# Patient Record
Sex: MALE | Race: WHITE | Employment: OTHER | URBAN - METROPOLITAN AREA
[De-identification: names, ages, dates, MRNs, and addresses within clinical notes are randomized per-mention and may not be internally consistent; named-entity substitution may affect disease eponyms.]

---

## 2017-02-27 DIAGNOSIS — E78.9 LIPID DISORDER: ICD-10-CM

## 2017-02-27 RX ORDER — SIMVASTATIN 20 MG
TABLET ORAL
Qty: 90 TABLET | Refills: 0 | Status: SHIPPED | OUTPATIENT
Start: 2017-02-27 | End: 2017-05-04 | Stop reason: SDUPTHER

## 2017-05-04 ENCOUNTER — OFFICE VISIT (OUTPATIENT)
Dept: PRIMARY CARE CLINIC | Age: 62
End: 2017-05-04
Payer: COMMERCIAL

## 2017-05-04 VITALS
BODY MASS INDEX: 26.65 KG/M2 | WEIGHT: 190.4 LBS | DIASTOLIC BLOOD PRESSURE: 84 MMHG | RESPIRATION RATE: 16 BRPM | HEIGHT: 71 IN | HEART RATE: 68 BPM | SYSTOLIC BLOOD PRESSURE: 122 MMHG

## 2017-05-04 DIAGNOSIS — N50.819 TESTICULAR PAIN: ICD-10-CM

## 2017-05-04 DIAGNOSIS — C61 PROSTATE CANCER (HCC): ICD-10-CM

## 2017-05-04 DIAGNOSIS — E78.9 LIPID DISORDER: ICD-10-CM

## 2017-05-04 DIAGNOSIS — I10 ESSENTIAL HYPERTENSION: Primary | ICD-10-CM

## 2017-05-04 DIAGNOSIS — J30.2 SEASONAL ALLERGIC RHINITIS, UNSPECIFIED ALLERGIC RHINITIS TRIGGER: ICD-10-CM

## 2017-05-04 PROCEDURE — 99214 OFFICE O/P EST MOD 30 MIN: CPT | Performed by: INTERNAL MEDICINE

## 2017-05-04 RX ORDER — METOPROLOL SUCCINATE 50 MG/1
50 TABLET, EXTENDED RELEASE ORAL DAILY
Qty: 30 TABLET | Refills: 5 | Status: SHIPPED | OUTPATIENT
Start: 2017-05-04 | End: 2017-09-20 | Stop reason: SDUPTHER

## 2017-05-04 RX ORDER — SIMVASTATIN 20 MG
TABLET ORAL
Qty: 90 TABLET | Refills: 0 | Status: SHIPPED | OUTPATIENT
Start: 2017-05-04 | End: 2017-09-20 | Stop reason: SDUPTHER

## 2017-05-04 RX ORDER — FEXOFENADINE HCL 180 MG/1
180 TABLET ORAL DAILY
Qty: 30 TABLET | Refills: 5 | Status: SHIPPED | OUTPATIENT
Start: 2017-05-04 | End: 2017-09-20 | Stop reason: ALTCHOICE

## 2017-05-04 ASSESSMENT — ENCOUNTER SYMPTOMS
BLURRED VISION: 0
BACK PAIN: 0
SHORTNESS OF BREATH: 0
SINUS PRESSURE: 1
ABDOMINAL PAIN: 0
CONSTIPATION: 0
ORTHOPNEA: 0

## 2017-05-04 ASSESSMENT — PATIENT HEALTH QUESTIONNAIRE - PHQ9
SUM OF ALL RESPONSES TO PHQ QUESTIONS 1-9: 0
1. LITTLE INTEREST OR PLEASURE IN DOING THINGS: 0
2. FEELING DOWN, DEPRESSED OR HOPELESS: 0
SUM OF ALL RESPONSES TO PHQ9 QUESTIONS 1 & 2: 0

## 2017-09-20 ENCOUNTER — OFFICE VISIT (OUTPATIENT)
Dept: FAMILY MEDICINE CLINIC | Age: 62
End: 2017-09-20
Payer: COMMERCIAL

## 2017-09-20 VITALS
DIASTOLIC BLOOD PRESSURE: 80 MMHG | WEIGHT: 188.6 LBS | BODY MASS INDEX: 26.4 KG/M2 | HEIGHT: 71 IN | TEMPERATURE: 97.8 F | SYSTOLIC BLOOD PRESSURE: 118 MMHG | HEART RATE: 64 BPM

## 2017-09-20 DIAGNOSIS — J30.9 CHRONIC ALLERGIC RHINITIS: ICD-10-CM

## 2017-09-20 DIAGNOSIS — J31.0 CHRONIC RHINITIS: ICD-10-CM

## 2017-09-20 DIAGNOSIS — I10 ESSENTIAL HYPERTENSION: ICD-10-CM

## 2017-09-20 DIAGNOSIS — E78.9 LIPID DISORDER: ICD-10-CM

## 2017-09-20 DIAGNOSIS — J06.9 VIRAL URI: Primary | ICD-10-CM

## 2017-09-20 PROCEDURE — 99213 OFFICE O/P EST LOW 20 MIN: CPT | Performed by: FAMILY MEDICINE

## 2017-09-20 RX ORDER — METOPROLOL SUCCINATE 50 MG/1
50 TABLET, EXTENDED RELEASE ORAL DAILY
Qty: 30 TABLET | Refills: 11 | Status: SHIPPED | OUTPATIENT
Start: 2017-09-20

## 2017-09-20 RX ORDER — SIMVASTATIN 20 MG
TABLET ORAL
Qty: 90 TABLET | Refills: 3 | Status: SHIPPED | OUTPATIENT
Start: 2017-09-20

## 2017-09-20 RX ORDER — FLUTICASONE PROPIONATE 50 MCG
1 SPRAY, SUSPENSION (ML) NASAL DAILY
Qty: 1 BOTTLE | Refills: 3 | Status: SHIPPED | OUTPATIENT
Start: 2017-09-20

## 2017-09-20 RX ORDER — INFLUENZA A VIRUS A/SINGAPORE/GP1908/2015 IVR-180 (H1N1) ANTIGEN (MDCK CELL DERIVED, PROPIOLACTONE INACTIVATED), INFLUENZA A VIRUS A/NORTH CAROLINA/04/2016 (H3N2) HEMAGGLUTININ ANTIGEN (MDCK CELL DERIVED, PROPIOLACTONE INACTIVATED), INFLUENZA B VIRUS B/IOWA/06/2017 HEMAGGLUTININ ANTIGEN (MDCK CELL DERIVED, PROPIOLACTONE INACTIVATED), INFLUENZA B VIRUS B/SINGAPORE/INFTT-16-0610/2016 HEMAGGLUTININ ANTIGEN (MDCK CELL DERIVED, PROPIOLACTONE INACTIVATED) 15; 15; 15; 15 UG/.5ML; UG/.5ML; UG/.5ML; UG/.5ML
INJECTION, SUSPENSION INTRAMUSCULAR
Refills: 0 | COMMUNITY
Start: 2017-09-05

## 2017-09-20 RX ORDER — FEXOFENADINE HCL AND PSEUDOEPHEDRINE HCI 180; 240 MG/1; MG/1
1 TABLET, EXTENDED RELEASE ORAL DAILY
Qty: 30 TABLET | Refills: 5 | Status: SHIPPED | OUTPATIENT
Start: 2017-09-20 | End: 2017-12-07 | Stop reason: ALTCHOICE

## 2017-09-20 RX ORDER — AZITHROMYCIN 250 MG/1
TABLET, FILM COATED ORAL
Qty: 1 PACKET | Refills: 0 | Status: SHIPPED | OUTPATIENT
Start: 2017-09-20 | End: 2017-09-30

## 2017-09-20 ASSESSMENT — ENCOUNTER SYMPTOMS
CHEST TIGHTNESS: 0
COUGH: 0
SHORTNESS OF BREATH: 0
SORE THROAT: 0
BLOOD IN STOOL: 0
NAUSEA: 0
VOMITING: 0
ORTHOPNEA: 0
ABDOMINAL PAIN: 0

## 2017-09-25 ENCOUNTER — OFFICE VISIT (OUTPATIENT)
Dept: FAMILY MEDICINE CLINIC | Age: 62
End: 2017-09-25
Payer: COMMERCIAL

## 2017-09-25 VITALS
WEIGHT: 187.6 LBS | HEART RATE: 61 BPM | HEIGHT: 71 IN | SYSTOLIC BLOOD PRESSURE: 141 MMHG | TEMPERATURE: 98.1 F | BODY MASS INDEX: 26.26 KG/M2 | DIASTOLIC BLOOD PRESSURE: 85 MMHG

## 2017-09-25 DIAGNOSIS — G25.2 TREMOR, COARSE: Primary | ICD-10-CM

## 2017-09-25 DIAGNOSIS — R45.89 ANXIOUS APPEARANCE: ICD-10-CM

## 2017-09-25 PROCEDURE — 99213 OFFICE O/P EST LOW 20 MIN: CPT | Performed by: FAMILY MEDICINE

## 2017-09-25 RX ORDER — LORAZEPAM 0.5 MG/1
0.5 TABLET ORAL EVERY 8 HOURS PRN
Qty: 6 TABLET | Refills: 0 | Status: SHIPPED | OUTPATIENT
Start: 2017-09-25 | End: 2017-11-01 | Stop reason: SDUPTHER

## 2017-09-25 ASSESSMENT — ENCOUNTER SYMPTOMS
SHORTNESS OF BREATH: 0
ABDOMINAL PAIN: 0
CHEST TIGHTNESS: 0

## 2017-10-05 ENCOUNTER — OFFICE VISIT (OUTPATIENT)
Dept: FAMILY MEDICINE CLINIC | Age: 62
End: 2017-10-05
Payer: COMMERCIAL

## 2017-10-05 VITALS
TEMPERATURE: 98.7 F | BODY MASS INDEX: 25.9 KG/M2 | WEIGHT: 185 LBS | SYSTOLIC BLOOD PRESSURE: 123 MMHG | DIASTOLIC BLOOD PRESSURE: 82 MMHG | HEART RATE: 65 BPM | HEIGHT: 71 IN

## 2017-10-05 DIAGNOSIS — K08.89 PAIN OF TOOTH SOCKET: ICD-10-CM

## 2017-10-05 DIAGNOSIS — I10 ESSENTIAL HYPERTENSION: Primary | ICD-10-CM

## 2017-10-05 DIAGNOSIS — F43.22 ADJUSTMENT DISORDER WITH ANXIOUS MOOD: ICD-10-CM

## 2017-10-05 PROCEDURE — 99213 OFFICE O/P EST LOW 20 MIN: CPT | Performed by: FAMILY MEDICINE

## 2017-10-05 ASSESSMENT — ENCOUNTER SYMPTOMS
COUGH: 0
SORE THROAT: 0
NAUSEA: 0
SHORTNESS OF BREATH: 0
ABDOMINAL PAIN: 0
CHEST TIGHTNESS: 0
BLOOD IN STOOL: 0
VOMITING: 0

## 2017-10-05 NOTE — PROGRESS NOTES
Subjective:      Patient ID: Jordan Beaulieu is a 57 y.o. male. HPI Comments: Recent dental procedure - feels flushed, ill, interest in pursuing blood work  Pending referrals to Neurology and ENT (self directed as per his worries). Fatigue   This is a new problem. The current episode started in the past 7 days. The problem occurs intermittently. The problem has been unchanged. Associated symptoms include fatigue. Pertinent negatives include no abdominal pain, arthralgias, chest pain, congestion, coughing, fever, myalgias, nausea, sore throat, vomiting or weakness. Nothing aggravates the symptoms. He has tried nothing for the symptoms. The treatment provided no relief. Review of Systems   Constitutional: Positive for fatigue. Negative for activity change and fever. HENT: Negative for congestion and sore throat. Respiratory: Negative for cough, chest tightness and shortness of breath. Cardiovascular: Negative for chest pain and leg swelling. Gastrointestinal: Negative for abdominal pain, blood in stool, nausea and vomiting. Genitourinary: Negative for dysuria and frequency. Musculoskeletal: Negative for arthralgias and myalgias. Neurological: Negative for dizziness, weakness and light-headedness. Hematological: Negative for adenopathy. Psychiatric/Behavioral: Negative for agitation, behavioral problems, sleep disturbance and suicidal ideas. Anxious / worried - unchanged from last visit several days ago. Objective:   Physical Exam   Constitutional: He appears well-developed and well-nourished. HENT:   Head: Normocephalic and atraumatic. Right Ear: External ear normal.   Left Ear: External ear normal.   Eyes: Conjunctivae are normal.   Neck: Neck supple. Cardiovascular: Normal rate, regular rhythm and normal heart sounds. Pulmonary/Chest: Effort normal and breath sounds normal.   Skin: Skin is warm and dry. No rash noted. No erythema. No pallor.    Psychiatric: He has a normal mood and affect. His behavior is normal. Judgment and thought content normal.       Assessment:      1. Essential hypertension  CBC With Auto Differential    Basic Metabolic Panel    Hepatic Function Panel   2. Pain of tooth socket  CBC With Auto Differential    Basic Metabolic Panel    Hepatic Function Panel   3. Adjustment disorder with anxious mood  CBC With Auto Differential    Basic Metabolic Panel    Hepatic Function Panel           Plan:      Labs - today - call results    Follow up in 3-4 days.

## 2017-10-05 NOTE — PROGRESS NOTES
HYPERTENSION visit     BP Readings from Last 3 Encounters:   10/05/17 123/82   09/25/17 (!) 141/85   09/20/17 118/80       BUN (mg/dL)   Date Value   12/09/2011 22 (H)     Creatinine (mg/dL)   Date Value   12/09/2011 0.87     Glucose (mg/dL)   Date Value   12/09/2011 81              Have you changed or started any medications since your last visit including any over-the-counter medicines, vitamins, or herbal medicines? no   Have you stopped taking any of your medications? Is so, why? -  no  Are you having any side effects from any of your medications? - no    Have you seen any other physician or provider since your last visit? yes - Dentist   Have you had any other diagnostic tests since your last visit?  no   Have you been seen in the emergency room and/or had an admission in a hospital since we last saw you?  no   Have you had your routine dental cleaning in the past 6 months?  no     Do you have an active MyChart account? If no, what is the barrier?   No: Pending    Patient Care Team:  Christelle Hubbard DO as PCP - General (Family Medicine)    Medical History Review  Past Medical, Family, and Social History reviewed and does contribute to the patient presenting condition    Health Maintenance   Topic Date Due    Hepatitis C screen  1955    HIV screen  12/06/1969    Lipid screen  12/06/1994    Diabetes screen  12/06/1994    Zostavax vaccine  12/06/2014    DTaP/Tdap/Td vaccine (1 - Tdap) 11/22/2017 (Originally 12/6/1973)    Flu vaccine (1) 11/26/2017 (Originally 9/1/2017)    Colon cancer screen colonoscopy  01/01/2020

## 2017-10-05 NOTE — MR AVS SNAPSHOT
After Visit Summary             Alicia Kay   10/5/2017 9:30 AM   Office Visit    Description:  Male : 1955   Provider:  Mario Chacon DO   Department:  [facility]              Your Follow-Up and Future Appointments         Below is a list of your follow-up and future appointments. This may not be a complete list as you may have made appointments directly with providers that we are not aware of or your providers may have made some for you. Please call your providers to confirm appointments. It is important to keep your appointments. Please bring your current insurance card, photo ID, co-pay, and all medication bottles to your appointment. If self-pay, payment is expected at the time of service. Your To-Do List     Future Appointments Provider Department Dept Phone    1/3/2018 9:00 AM Cole Chow MD [facility] Neu[facility] Specialist [phone]    Please arrive 15 minutes prior to scheduled appointment time to complete paper work, bring photo ID and insurance cards.     Future Orders Complete By Expires    Basic Metabolic Panel [GPB19 Custom]  10/5/2017 10/5/2018    CBC With Auto Differential [TPV2885 Custom]  10/5/2017 10/5/2018    Hepatic Function Panel [LAB20 Custom]  10/5/2017 10/5/2018         Information from Your Visit        Department     Name Address Phone Fax    [address] [place] [address] [phone]      You Were Seen for:         Comments    Essential hypertension   [433967]         Vital Signs     Blood Pressure Pulse Temperature Height Weight Body Mass Index    123/82 (Site: Left Arm, Position: Sitting, Cuff Size: Large Adult) 65 98.7 °F (37.1 °C) (Temporal) 5' 10.87\" (1.8 m) 185 lb (83.9 kg) 25.9 kg/m2    Smoking Status                   Former Smoker           Additional Information about your Body Mass Index (BMI) Your BMI as listed above is considered overweight (25.0-29.9). BMI is an estimate of body fat, calculated from your height and weight. The higher your BMI, the greater your risk of heart disease, high blood pressure, type 2 diabetes, stroke, gallstones, arthritis, sleep apnea, and certain cancers. BMI is not perfect. It may overestimate body fat in athletes and people who are more muscular. If your body fat is high you can improve your BMI by decreasing your calorie intake and becoming more physically active. Learn more at: [employer]          Instructions    Thank you for letting us take care of you today. We hope all your questions were addressed. If a question was overlooked or something else comes to mind after you return home, please contact a member of your Care Team listed below. Please make sure you have a routine office visit set up to follow-up on [address]. Your Care Team at [place] [address] is Team #2  Radha Velasquez DO (Faculty)  Olman Duvall MD (Resindent)  Reji Tam MD (Resident)  Leatha Campbell MD (Resident)  Keenan Nuno MD (Resident)  Keya Atkinson MD (Resident)  Kednall Longoria MELANIE  Saintclair Blessing., RMA  Omid Elliott, ANTONI Mckinney ([address])  Lawson Dorantes RN, ([address] [facility])  Jesus Cortez, Ph.D., (Behavioral Services)  Milo Mcdermott [facility] (Clinical Pharmacist)     Office phone number: [phone]    If you need to get in right away due to illness, please be advised we have \"Same Day\" appointments available Monday-Friday. Please call us at [phone] option #1 to schedule your \"Same Day\" appointment.                 Medications and Orders      Your Current Medications Are              LORazepam (ATIVAN) 0.5 MG tablet Take 1 tablet by mouth every 8 hours as needed for Anxiety    FLUCELVAX QUADRIVALENT 0.5 ML injection ADM 0.5ML IM UTD

## 2017-10-10 ENCOUNTER — TELEPHONE (OUTPATIENT)
Dept: ADMINISTRATIVE | Age: 62
End: 2017-10-10

## 2017-10-10 NOTE — TELEPHONE ENCOUNTER
Sarah Cristhian call Pathology Labs and inquire if the results of his recent blood tests could be forwarded to his chart for review. (scan / upload to media). Please notify him that we are actively pursuing the results and as soon as I see them I will report the results.     Thank-you,    Electronically signed by Shanita Valle DO on 10/10/2017 at 2:26 PM

## 2017-10-10 NOTE — TELEPHONE ENCOUNTER
Looking into Pt's chart if he is calling in regards to lab work it is still showing them as being active, please advise

## 2017-10-10 NOTE — TELEPHONE ENCOUNTER
Patient called yesterday requesting test results and has not been contacted yet. Please call the patient today in regards to his results.

## 2017-10-11 ENCOUNTER — TELEPHONE (OUTPATIENT)
Dept: FAMILY MEDICINE CLINIC | Age: 62
End: 2017-10-11

## 2017-10-11 NOTE — TELEPHONE ENCOUNTER
Copy of Pathology Labs mailed to patient. (BMP, LFT and CBC)  Labs reviewed - all within range of normal.  No further action needed.     Electronically signed by Rivera Wright DO on 10/11/2017 at 1:55 PM

## 2017-10-12 ENCOUNTER — TELEPHONE (OUTPATIENT)
Dept: FAMILY MEDICINE CLINIC | Age: 62
End: 2017-10-12

## 2017-10-12 ENCOUNTER — OFFICE VISIT (OUTPATIENT)
Dept: FAMILY MEDICINE CLINIC | Age: 62
End: 2017-10-12
Payer: COMMERCIAL

## 2017-10-12 VITALS
TEMPERATURE: 98.6 F | WEIGHT: 185.2 LBS | DIASTOLIC BLOOD PRESSURE: 87 MMHG | HEIGHT: 71 IN | BODY MASS INDEX: 25.93 KG/M2 | SYSTOLIC BLOOD PRESSURE: 129 MMHG | HEART RATE: 66 BPM

## 2017-10-12 DIAGNOSIS — R06.02 SHORTNESS OF BREATH: ICD-10-CM

## 2017-10-12 DIAGNOSIS — R07.89 SENSATION OF CHEST PRESSURE: ICD-10-CM

## 2017-10-12 DIAGNOSIS — R00.2 HEART PALPITATIONS: Primary | ICD-10-CM

## 2017-10-12 PROCEDURE — 99213 OFFICE O/P EST LOW 20 MIN: CPT | Performed by: RADIOLOGY

## 2017-10-12 ASSESSMENT — ENCOUNTER SYMPTOMS
COUGH: 0
ABDOMINAL PAIN: 0
CONSTIPATION: 0
SHORTNESS OF BREATH: 1
WHEEZING: 0
NAUSEA: 0
VOMITING: 0
DIARRHEA: 0

## 2017-10-12 NOTE — PATIENT INSTRUCTIONS
Thank you for letting us take care of you today. We hope all your questions were addressed. If a question was overlooked or something else comes to mind after you return home, please contact a member of your Care Team listed below. Please make sure you have a routine office visit set up to follow-up on [address]. Your Care Team at [place] [address] is Team #1  Mara Ahumada, MD (Faculty)  Yvonne Rehman MD (Faculty  Chareri Orellana MD (Resident)  Marylin Moore MD (Resident)  Rock Rick MD (Resident)  May Rosas MD (Resident)  Kyle Knight MD (Resident)  Andrea Khanna TAVON Noble TAVON SOLITARIO, [facility], ([address] [facility])  DANISH Weiss, ([address] [facility])  Siddhartha Cervantes, Ph.D., ([facility])  Junior Guerra [facility] (Clinical Pharmacist)     Office phone number: [phone]    If you need to get in right away due to illness, please be advised we have \"Same Day\" appointments available Monday-Friday. Please call us at [phone] option #1 to schedule your \"Same Day\" appointment.

## 2017-10-12 NOTE — PROGRESS NOTES
Visit Information    Have you changed or started any medications since your last visit including any over-the-counter medicines, vitamins, or herbal medicines? no   Have you stopped taking any of your medications? Is so, why? -  no  Are you having any side effects from any of your medications? - no    Have you seen any other physician or provider since your last visit? ENT Dr. Mark Tai  Have you had any other diagnostic tests since your last visit? yes - Endoscope   Have you been seen in the emergency room and/or had an admission in a hospital since we last saw you?  no   Have you had your routine dental cleaning in the past 6 months?  no     Do you have an active MyChart account? If no, what is the barrier?   No: Pending    Patient Care Team:  Jamel Suarez DO as PCP - General (Family Medicine)    Medical History Review  Past Medical, Family, and Social History reviewed and does not contribute to the patient presenting condition    Health Maintenance   Topic Date Due    Hepatitis C screen  1955    HIV screen  12/06/1969    Lipid screen  12/06/1994    Diabetes screen  12/06/1994    Zostavax vaccine  12/06/2014    DTaP/Tdap/Td vaccine (1 - Tdap) 11/22/2017 (Originally 12/6/1973)    Flu vaccine (1) 11/26/2017 (Originally 9/1/2017)    Colon cancer screen colonoscopy  01/01/2020

## 2017-10-12 NOTE — PROGRESS NOTES
Subjective:      Patient ID: Dae Mcclendon is a 57 y.o. male. HPI    Pt is a 56 yo M presenting with c/o heart \"fluttering\" and chest pressure starting overnight ~0100. Pt describes \"heart fluttering\" sensation in center sternum, no radiation, no pain. States severity has improved since last night. Pt reports that he had a tooth extraction at dentist ~3 weeks prior, developed symptoms of lightheadedness, throbbing/pounding HA worse when laying down, dyspnea with exertion and at rest, and subjective fever/chills. Pt reports checking BP at home, elevated multiple times to diastolic 623-150'U. No elevation in HR. Pt states he has had to sleep upright 2/2 throbbing HA and dyspnea. Denies any swelling/edema. Endorses good compliance with medication. Saw ENT yesterday for continued concern over tooth extraction/jaw pain, was given Augmentin 2/2 infection. Denies fever/chills, N/V, abdominal pain, diarrhea, constipation, dysuria    Review of Systems   Constitutional: Positive for chills, fatigue and fever (subjective). Negative for diaphoresis. Respiratory: Positive for shortness of breath. Negative for cough and wheezing. Cardiovascular: Positive for chest pain (pressure) and palpitations. Negative for leg swelling. Gastrointestinal: Negative for abdominal pain, constipation, diarrhea, nausea and vomiting. Genitourinary: Negative for difficulty urinating and dysuria. Objective:   Physical Exam   Constitutional: He appears well-developed and well-nourished. No distress. Neck: No JVD present. Cardiovascular: Normal rate, regular rhythm and intact distal pulses. Exam reveals distant heart sounds. Pulmonary/Chest: Effort normal and breath sounds normal. No respiratory distress. He has no wheezes. He has no rales. Abdominal: Soft. Bowel sounds are normal. He exhibits no distension. There is no tenderness. There is no rebound and no guarding. Musculoskeletal: He exhibits no edema.

## 2017-10-16 ENCOUNTER — TELEPHONE (OUTPATIENT)
Dept: FAMILY MEDICINE CLINIC | Age: 62
End: 2017-10-16

## 2017-10-16 NOTE — TELEPHONE ENCOUNTER
Pt calling states he is not any better having chest pain and palpitations. Was seen here in the office on 10-13-17 as a same day appointment. Pt seen by Dr Hector Presfuad in our office. Given a echo and ekg order to do outpatient. Pt states he went to [facility] had EKG done but the \"did not have time to do echo\". After speaking with this pt advised him to proceed to the ER for evaluation. Pt refused stressed the importance he states\" he cant afford this \". States he has appt with Dr Wenceslao Guadarrama with [facility] this Wednesday but they wont see him till he \"gets the echo done\" Call to Jakob Lopez at [facility] this is not the case. It has to do with with insurance certification. Call to [facility] requested copy of EKG. Call to central scheduling scheduled pt a echocardiogram appointment at the heart and vascular center at [facility] for 1030 am tomorrow. Pt also given directions on the location of the building and to arrive at 10 am. Asking for labs done at path labs results given. EKG received [facility] and will be scanned into pts chart with the labs also. Pt still advised to go to the ER if  symptoms worsen. Pt verbalized understanding of this plan of care

## 2017-10-17 ENCOUNTER — HOSPITAL ENCOUNTER (OUTPATIENT)
Dept: NON INVASIVE DIAGNOSTICS | Age: 62
Discharge: HOME | End: 2017-10-17
Payer: COMMERCIAL

## 2017-10-17 DIAGNOSIS — R06.02 SHORTNESS OF BREATH: ICD-10-CM

## 2017-10-17 DIAGNOSIS — R00.2 HEART PALPITATIONS: ICD-10-CM

## 2017-10-17 LAB
LV EF: 58 %
LVEF MODALITY: NORMAL

## 2017-10-17 PROCEDURE — 93306 TTE W/DOPPLER COMPLETE: CPT

## 2017-10-26 ENCOUNTER — TELEPHONE (OUTPATIENT)
Dept: ADMINISTRATIVE | Age: 62
End: 2017-10-26

## 2017-10-26 NOTE — TELEPHONE ENCOUNTER
Patient is calling because he would like some more of the ativan that was prescribed for him. He has some vestibular testing on mon 10/30/17. This medications helps with his shakiness.   Please address

## 2017-10-30 ENCOUNTER — TELEPHONE (OUTPATIENT)
Dept: FAMILY MEDICINE CLINIC | Age: 62
End: 2017-10-30

## 2017-10-30 NOTE — TELEPHONE ENCOUNTER
PT called again today, explained that notes have been routed onward from med assistant and that notes pends.     Pt can be reached at [phone]

## 2017-10-30 NOTE — TELEPHONE ENCOUNTER
Pt is calling again for ativan , he was prescribed this before. Pt has some testing coming up and the medication helps with his shakiness. Please address , thank you.

## 2017-10-31 ENCOUNTER — TELEPHONE (OUTPATIENT)
Dept: FAMILY MEDICINE CLINIC | Age: 62
End: 2017-10-31

## 2017-10-31 NOTE — TELEPHONE ENCOUNTER
Good morning patient is calling again about his script for Ativan, need for the Shakes, please thanks writer.

## 2017-10-31 NOTE — TELEPHONE ENCOUNTER
Appointment offered to patient for tomorrow morning at 8:00 or 8:30 AM    No phone refills. Patient accepted 8:30.

## 2017-11-01 ENCOUNTER — OFFICE VISIT (OUTPATIENT)
Dept: FAMILY MEDICINE CLINIC | Age: 62
End: 2017-11-01
Payer: COMMERCIAL

## 2017-11-01 VITALS
WEIGHT: 186.2 LBS | TEMPERATURE: 97.7 F | SYSTOLIC BLOOD PRESSURE: 120 MMHG | HEART RATE: 74 BPM | HEIGHT: 71 IN | DIASTOLIC BLOOD PRESSURE: 72 MMHG | BODY MASS INDEX: 26.07 KG/M2

## 2017-11-01 DIAGNOSIS — F41.8 SITUATIONAL ANXIETY: ICD-10-CM

## 2017-11-01 DIAGNOSIS — G25.2 TREMOR, COARSE: Primary | ICD-10-CM

## 2017-11-01 PROCEDURE — 1036F TOBACCO NON-USER: CPT | Performed by: FAMILY MEDICINE

## 2017-11-01 PROCEDURE — 99213 OFFICE O/P EST LOW 20 MIN: CPT | Performed by: FAMILY MEDICINE

## 2017-11-01 PROCEDURE — G8484 FLU IMMUNIZE NO ADMIN: HCPCS | Performed by: FAMILY MEDICINE

## 2017-11-01 PROCEDURE — 3017F COLORECTAL CA SCREEN DOC REV: CPT | Performed by: FAMILY MEDICINE

## 2017-11-01 PROCEDURE — G8417 CALC BMI ABV UP PARAM F/U: HCPCS | Performed by: FAMILY MEDICINE

## 2017-11-01 PROCEDURE — G8427 DOCREV CUR MEDS BY ELIG CLIN: HCPCS | Performed by: FAMILY MEDICINE

## 2017-11-01 RX ORDER — LORAZEPAM 0.5 MG/1
0.5 TABLET ORAL EVERY 8 HOURS PRN
Qty: 15 TABLET | Refills: 0 | Status: SHIPPED | OUTPATIENT
Start: 2017-11-01 | End: 2017-11-16

## 2017-11-01 ASSESSMENT — ENCOUNTER SYMPTOMS
SORE THROAT: 0
DIARRHEA: 0
BACK PAIN: 0
DOUBLE VISION: 0
BLOOD IN STOOL: 0
BLURRED VISION: 0
SHORTNESS OF BREATH: 0
EYE PAIN: 0
WHEEZING: 0
ORTHOPNEA: 0
COUGH: 0
NAUSEA: 0
ABDOMINAL PAIN: 0
VOMITING: 0

## 2017-11-01 NOTE — PROGRESS NOTES
Visit Information    Have you changed or started any medications since your last visit including any over-the-counter medicines, vitamins, or herbal medicines? no   Have you stopped taking any of your medications? Is so, why? -  no  Are you having any side effects from any of your medications? - no    Have you seen any other physician or provider since your last visit? yes - Dr. Yuval Hou (Cardiology), and Dr. Gualberto Browning (ENT), ENG at [facility]. Have you had any other diagnostic tests since your last visit? yes -    Have you been seen in the emergency room and/or had an admission in a hospital since we last saw you?  no   Have you had your routine dental cleaning in the past 6 months?  no     Do you have an active MyChart account? If no, what is the barrier?   No: Declined    Patient Care Team:  Esteafny Pendleton DO as PCP - General (Family Medicine)    Medical History Review  Past Medical, Family, and Social History reviewed and does not contribute to the patient presenting condition    Health Maintenance   Topic Date Due    Hepatitis C screen  1955    HIV screen  12/06/1969    Lipid screen  12/06/1994    Diabetes screen  12/06/1994    Zostavax vaccine  12/06/2014    DTaP/Tdap/Td vaccine (1 - Tdap) 11/22/2017 (Originally 12/6/1973)    Flu vaccine (1) 11/26/2017 (Originally 9/1/2017)    Colon cancer screen colonoscopy  01/01/2020
short fill. The patient is asked to return in 1 week.                   Electronically signed by Gemma Martin DO on 11/1/2017 at 9:08 AM

## 2017-11-01 NOTE — PATIENT INSTRUCTIONS
Thank you for letting us take care of you today. We hope all your questions were addressed. If a question was overlooked or something else comes to mind after you return home, please contact a member of your Care Team listed below. Please make sure you have a routine office visit set up to follow-up on [address]. Your Care Team at [place] [address] is Team #2  Tanika Brothers DO (Faculty)  Robert Vidal MD (Resindent)  Todd Payan MD (Resident)  Maria Teresa Jung MD (Resident)  Trisha Ag MD (Resident)  Yessi Pantoja MD (Resident)  Bebeto Delgado, MELANIE  [employer] [facility]., A  Adamaris Dodson, A  Juan J Brewer ([facility])  Kandi Ball RN, ([address] [facility])  Christian Ma, Ph.D., (Behavioral Services)  Cyndie Jefferson, [facility] (Clinical Pharmacist)     Office phone number: [phone]    If you need to get in right away due to illness, please be advised we have \"Same Day\" appointments available Monday-Friday. Please call us at [phone] option #1 to schedule your \"Same Day\" appointment.

## 2017-11-02 ENCOUNTER — OFFICE VISIT (OUTPATIENT)
Dept: NEUROLOGY | Age: 62
End: 2017-11-02
Payer: COMMERCIAL

## 2017-11-02 VITALS
HEART RATE: 69 BPM | DIASTOLIC BLOOD PRESSURE: 84 MMHG | BODY MASS INDEX: 25.68 KG/M2 | WEIGHT: 183.4 LBS | HEIGHT: 71 IN | SYSTOLIC BLOOD PRESSURE: 131 MMHG

## 2017-11-02 DIAGNOSIS — R53.1 LEFT-SIDED WEAKNESS: ICD-10-CM

## 2017-11-02 DIAGNOSIS — R25.1 TREMOR: Primary | ICD-10-CM

## 2017-11-02 PROCEDURE — G8417 CALC BMI ABV UP PARAM F/U: HCPCS | Performed by: NURSE PRACTITIONER

## 2017-11-02 PROCEDURE — 3017F COLORECTAL CA SCREEN DOC REV: CPT | Performed by: NURSE PRACTITIONER

## 2017-11-02 PROCEDURE — G8484 FLU IMMUNIZE NO ADMIN: HCPCS | Performed by: NURSE PRACTITIONER

## 2017-11-02 PROCEDURE — G8427 DOCREV CUR MEDS BY ELIG CLIN: HCPCS | Performed by: NURSE PRACTITIONER

## 2017-11-02 PROCEDURE — 99203 OFFICE O/P NEW LOW 30 MIN: CPT | Performed by: NURSE PRACTITIONER

## 2017-11-02 PROCEDURE — 1036F TOBACCO NON-USER: CPT | Performed by: NURSE PRACTITIONER

## 2017-11-02 NOTE — PROGRESS NOTES
[facility]  [facility], [address] [place] [address], [address] [place] [address]  Dept: [phone]  Dept Fax: [phone]                              MD Carol Nash MD Ahmed B. Alferd Breed, MD Tyrell Peeks, MD Judieth Leigh, MD Laverta Mas, CNP    New Patient Consultation    11/2/2017    History of Present Illness: Your patient, Jorge Branch presents with tremor. Patient is a 79-year-old male with prior medical history of prostate cancer, hypertension, and dyslipidemia. On September 13, patient suddenly developed left-sided tremor which has been persistent and often involves the right arm as well. The tremor is there all the time, and there is nothing that improves it, including Ativan 0.5 mg. He also notes that he has trouble focusing on things especially while watching television. He feels like his eyes are \"All over the place\". He has also noticed weakness of his left side, especially in his leg. He states that his face feels funny bilaterally in the maxillary area and he has head pressure. He notes that his voice quivers. He also states that he feels very unsteady primarily because he has trouble focusing on things. Patient denies double vision, difficulty with his bowel or bladder, or any episodes of loss of consciousness or loss of vision. Patient saw an ear nose and throat specialist, Dr. Yuri Hartmann, who did a hearing test, which was normal.  Patient had an electronystagmogram, which the patient states showed severe nystagmus. Patient also had an evaluation by cardiology including a Holter monitor and EKG, which were normal.  He is scheduled to have a stress test.    Patient has no prior history of neurologic disease. He denies any previous visual difficulties including diplopia or loss of vision.   He clotrimazole-betamethasone (LOTRISONE) 1-0.05 % cream Apply topically 2 times daily. 30 g 2    CIALIS 5 MG tablet   6    tamsulosin (FLOMAX) 0.4 MG capsule   6     No current facility-administered medications for this visit. Allergies   Allergen Reactions    Darvocet A500 [Propoxyphene N-Acetaminophen]     Motrin [Ibuprofen]     Percocet [Oxycodone-Acetaminophen]     Valtrex [Valacyclovir Hcl]             REVIEW OF SYSTEMS    CONSTITUTIONAL Weight: absent, Appetite: absent, Fatigue: present      HEENT Ears: diminished, Eyes: glasses and contacts, Visual disturbance: absent   RESPIRATORY Shortness of breath: present, Cough: absent   CARDIOVASCULAR Chest pain: present, Leg swelling :absent      GI Constipation: absent, Diarrhea: absent, Swallowing change: absent       Urinary frequency: absent, Urinary urgency: absent, Urinary incontinence: absent   MUSCULOSKELETAL Neck pain: present, Back pain: absent, Stiffness: present, Muscle pain: absent, Joint pain: absent Restless legs: absent   DERMATOLOGIC Hair loss: absent, Skin changes: absent   NEUROLOGIC Memory loss: absent, Confusion: present, Seizures: absent Trouble walking or imbalance: present, Dizziness: present, Weakness: present, Numbness: absent Tremor: present, Spasm: absent, Speech difficulty: present, Headache: present, Light sensitivity: absent   PSYCHIATRIC Anxiety: absent, Hallucination: absent, Mood disorder: absent   HEMATOLOGIC Abnormal bleeding: absent, Anemia: absent, Clotting disorder: absent, Lymph gland changes: absent       There were no vitals filed for this visit. Admission weight:                                             .                                                                                                    General Appearance:  Alert, cooperative, no signs of distress, appears stated age   Head:  Normocephalic, no signs of trauma   Eyes:  Conjunctiva/corneas clear;  eyelids intact.  No optic disc pallor   Ears: Normal external ear and canals   Nose: Nares normal, mucosa normal, no drainage    Throat: Lips and tongue normal; teeth normal;  gums normal   Neck: Supple, intact flexion, extension and rotation;   trachea midline;  no adenopathy;   thyroid: not enlarged;   no carotid pulse abnormality   Back:   Symmetric, no curvature, ROM adequate   Lungs:   Respirations unlabored   Chest Wall:  No deformity   Heart:  Regular rate and rhythm                 Extremities: Extremities normal, no cyanosis, no edema   Pulses: Symmetric over head and neck   Skin: Skin color, texture normal, no rashes, no lesions               Neurological Examination    Neurologic Exam     Mental Status   Oriented to person, place, and time. Follows 3 step commands. Attention: normal.   Speech: speech is normal   Level of consciousness: alert  Knowledge: good. Normal comprehension. Cranial Nerves     CN II   Visual fields full to confrontation. CN III, IV, VI   Pupils are equal, round, and reactive to light. Extraocular motions are normal.   Nystagmus: none   Diplopia: none  Ophthalmoparesis: none  Upgaze: normal  Downgaze: normal  Conjugate gaze: present    CN V   Facial sensation intact. Right facial sensation deficit: cheeks  Left facial sensation deficit: cheeks  Right corneal reflex: normal  Left corneal reflex: normal    CN VII   Facial expression full, symmetric. Right facial weakness: none  Left facial weakness: none    CN VIII   CN VIII normal.   Hearing: intact    CN IX, X   CN IX normal.     CN XI   CN XI normal.     CN XII   CN XII normal.     Motor Exam   Muscle bulk: normal  Overall muscle tone: normal  Right arm tone: normal  Left arm tone: normal  Right arm pronator drift: absent  Left arm pronator drift: absent  Right leg tone: increased  Left leg tone: increased    Strength   Strength 5/5 throughout.      Sensory Exam   Right arm light touch: normal  Left arm light touch: normal  Right leg light touch: normal  Left leg light touch: decreased from knee  Vibration normal.   Right arm vibration: normal  Left arm vibration: normal  Right arm pinprick: normal  Left arm pinprick: normal  Right leg pinprick: normal  Left leg pinprick: decreased from knee    Gait, Coordination, and Reflexes     Gait  Gait: normal    Coordination   Romberg: negative  Finger to nose coordination: normal  Tandem walking coordination: normal    Tremor   Resting tremor: absent  Intention tremor: present    Reflexes   Right brachioradialis: 2+  Left brachioradialis: 2+  Right biceps: 2+  Left biceps: 2+  Right triceps: 2+  Left triceps: 2+  Right patellar: 3+  Left patellar: 4+  Right achilles: 2+  Left achilles: 2+  Right plantar: normal  Left plantar: upgoing  Right ankle clonus: absent  Left ankle clonus: absentTremor present in bilateral arms and left leg             Assessment/Plan: :    Patient presents with a 6 week history of sudden onset of bilateral arm tremors, left leg tremor, left leg weakness and visual blurring. On exam patient displays fine motor tremor, with weakness of his left lower extremity, with associated upgoing toe and hyperreflexia. There was no nystagmus observed during this visit, however patient does report that an ENG was abnormal due to nystagmus. There is no evidence of parkinsonism, with increased upper extremity tone or cogwheel rigidity. His gait is relatively normal, and his tremor is not at rest.   We will rule out the possibility of demyelinative disease versus an acute vascular event at the onset of symptoms.   We will obtain an MRI of the brain with and without contrast  Patient will return in 3-4 weeks for reevaluation of his symptoms and diagnostic testing  We will obtain records from Dr. Cate Paz, the ENT  Specialist and the ENG report        Signed: Kelly Cabrera, ARTHUR

## 2017-11-02 NOTE — LETTER
Patient also had an evaluation by cardiology including a Holter monitor and EKG, which were normal.  He is scheduled to have a stress test.    Patient has no prior history of neurologic disease. He denies any previous visual difficulties including diplopia or loss of vision. He denies any previous weakness in his extremities or numbness or tingling. He knows no previous balance difficulties Patient is frustrated and anxious, because he feels as though his symptoms have been progressing and were sudden in onset. He had been an active person, going to the gym and riding his motorcycle, as well as working on his cars. He is unable to do any of those activities because of his tremor and because of his inability to focus, as well as the weakness on his left arm and leg. Past Medical History:   Diagnosis Date    Erectile dysfunction     Hypertension     Prostate cancer ([facility] [address]        Past Surgical History:   Procedure Laterality Date    BACK SURGERY      PROSTATE BIOPSY      PROSTATE SURGERY      SHOULDER SURGERY Bilateral     TONSILLECTOMY         No family history on file.     Social History     Social History    Marital status: Single     Spouse name: N/A    Number of children: N/A    Years of education: N/A     Social History Main Topics    Smoking status: Former Smoker     Quit date: 1/1/1983    Smokeless tobacco: Never Used    Alcohol use No    Drug use: No    Sexual activity: Yes     Partners: Female     Other Topics Concern    Not on file     Social History Narrative    No narrative on file       Current Outpatient Prescriptions   Medication Sig Dispense Refill    LORazepam (ATIVAN) 0.5 MG tablet Take 1 tablet by mouth every 8 hours as needed for Anxiety 15 tablet 0    FLUCELVAX QUADRIVALENT 0.5 ML injection ADM 0.5ML IM UTD  0    simvastatin (ZOCOR) 20 MG tablet TAKE 1 TABLET BY MOUTH EVERY DAY IN THE EVENING 90 tablet 3  metoprolol succinate (TOPROL XL) 50 MG extended release tablet Take 1 tablet by mouth daily 30 tablet 11    fexofenadine-pseudoephedrine (ALLEGRA-D 24HR) 180-240 MG per extended release tablet Take 1 tablet by mouth daily 30 tablet 5    fluticasone (FLONASE) 50 MCG/ACT nasal spray 1 spray by Nasal route daily 1 Bottle 3    clotrimazole-betamethasone (LOTRISONE) 1-0.05 % cream Apply topically 2 times daily. 30 g 2    CIALIS 5 MG tablet   6    tamsulosin (FLOMAX) 0.4 MG capsule   6     No current facility-administered medications for this visit. Allergies   Allergen Reactions    Darvocet A500 [Propoxyphene N-Acetaminophen]     Motrin [Ibuprofen]     Percocet [Oxycodone-Acetaminophen]     Valtrex [Valacyclovir Hcl]             REVIEW OF SYSTEMS    CONSTITUTIONAL Weight: absent, Appetite: absent, Fatigue: present      HEENT Ears: diminished, Eyes: glasses and contacts, Visual disturbance: absent   RESPIRATORY Shortness of breath: present, Cough: absent   CARDIOVASCULAR Chest pain: present, Leg swelling :absent      GI Constipation: absent, Diarrhea: absent, Swallowing change: absent       Urinary frequency: absent, Urinary urgency: absent, Urinary incontinence: absent   MUSCULOSKELETAL Neck pain: present, Back pain: absent, Stiffness: present, Muscle pain: absent, Joint pain: absent Restless legs: absent   DERMATOLOGIC Hair loss: absent, Skin changes: absent   NEUROLOGIC Memory loss: absent, Confusion: present, Seizures: absent Trouble walking or imbalance: present, Dizziness: present, Weakness: present, Numbness: absent Tremor: present, Spasm: absent, Speech difficulty: present, Headache: present, Light sensitivity: absent   PSYCHIATRIC Anxiety: absent, Hallucination: absent, Mood disorder: absent   HEMATOLOGIC Abnormal bleeding: absent, Anemia: absent, Clotting disorder: absent, Lymph gland changes: absent       There were no vitals filed for this visit.   Admission weight: Overall muscle tone: normal  Right arm tone: normal  Left arm tone: normal  Right arm pronator drift: absent  Left arm pronator drift: absent  Right leg tone: increased  Left leg tone: increased    Strength   Strength 5/5 throughout. Sensory Exam   Right arm light touch: normal  Left arm light touch: normal  Right leg light touch: normal  Left leg light touch: decreased from knee  Vibration normal.   Right arm vibration: normal  Left arm vibration: normal  Right arm pinprick: normal  Left arm pinprick: normal  Right leg pinprick: normal  Left leg pinprick: decreased from knee    Gait, Coordination, and Reflexes     Gait  Gait: normal    Coordination   Romberg: negative  Finger to nose coordination: normal  Tandem walking coordination: normal    Tremor   Resting tremor: absent  Intention tremor: present    Reflexes   Right brachioradialis: 2+  Left brachioradialis: 2+  Right biceps: 2+  Left biceps: 2+  Right triceps: 2+  Left triceps: 2+  Right patellar: 3+  Left patellar: 4+  Right achilles: 2+  Left achilles: 2+  Right plantar: normal  Left plantar: upgoing  Right ankle clonus: absent  Left ankle clonus: absentTremor present in bilateral arms and left leg             Assessment/Plan: :    Patient presents with a 6 week history of sudden onset of bilateral arm tremors, left leg tremor, left leg weakness and visual blurring. On exam patient displays fine motor tremor, with weakness of his left lower extremity, with associated upgoing toe and hyperreflexia. There was no nystagmus observed during this visit, however patient does report that an ENG was abnormal due to nystagmus. There is no evidence of parkinsonism, with increased upper extremity tone or cogwheel rigidity. His gait is relatively normal, and his tremor is not at rest.   We will rule out the possibility of demyelinative disease versus an acute vascular event at the onset of symptoms.   We will obtain an MRI of the brain with and without contrast

## 2017-11-03 ENCOUNTER — TELEPHONE (OUTPATIENT)
Dept: NEUROLOGY | Age: 62
End: 2017-11-03

## 2017-11-07 NOTE — TELEPHONE ENCOUNTER
I spoke with Kaden and gave him this information. He voiced understanding. He said that he would probably need more that one tablet for the test as he is very claustrophobic. I ask he was scheduled yet and he said that it was still pending authorization from his insurance company. He will call if he needs medication ordered.

## 2017-11-08 ENCOUNTER — TELEPHONE (OUTPATIENT)
Dept: ADMINISTRATIVE | Age: 62
End: 2017-11-08

## 2017-11-08 NOTE — TELEPHONE ENCOUNTER
Patient has contacted his neurologist regarding medication for his MRI, his neurologist has also agreed to prescribe for him , telephone encounter on 11/03/2017

## 2017-11-08 NOTE — TELEPHONE ENCOUNTER
Patient called to request medication to calm him down during a MRI on 11/14. Patient is claustrophobic and can't handle the MRI with out something to calm him down. Patient was last seen 11/1 and Dr Paul Middleton MRI.  Please sent RX to [facility] on [address] or call patient with any question at [phone]

## 2017-11-13 ENCOUNTER — TELEPHONE (OUTPATIENT)
Dept: NEUROLOGY | Age: 62
End: 2017-11-13

## 2017-11-13 RX ORDER — DIAZEPAM 5 MG/1
5 TABLET ORAL ONCE
Qty: 2 TABLET | Refills: 0 | Status: SHIPPED | OUTPATIENT
Start: 2017-11-13 | End: 2017-11-13

## 2017-11-14 ENCOUNTER — HOSPITAL ENCOUNTER (OUTPATIENT)
Dept: MRI IMAGING | Facility: CLINIC | Age: 62
Discharge: HOME | End: 2017-11-14
Payer: COMMERCIAL

## 2017-11-14 DIAGNOSIS — R25.1 TREMOR: ICD-10-CM

## 2017-11-14 PROCEDURE — A9579 GAD-BASE MR CONTRAST NOS,1ML: HCPCS | Performed by: NURSE PRACTITIONER

## 2017-11-14 PROCEDURE — 70553 MRI BRAIN STEM W/O & W/DYE: CPT

## 2017-11-14 PROCEDURE — 6360000004 HC RX CONTRAST MEDICATION: Performed by: NURSE PRACTITIONER

## 2017-11-14 RX ADMIN — GADOTERIDOL 17 ML: 279.3 INJECTION, SOLUTION INTRAVENOUS at 09:43

## 2017-11-21 DIAGNOSIS — F41.8 SITUATIONAL ANXIETY: ICD-10-CM

## 2017-11-21 DIAGNOSIS — G25.2 TREMOR, COARSE: ICD-10-CM

## 2017-11-21 RX ORDER — LORAZEPAM 0.5 MG/1
0.5 TABLET ORAL EVERY 8 HOURS PRN
Qty: 15 TABLET | Refills: 0 | OUTPATIENT
Start: 2017-11-21 | End: 2017-12-06

## 2017-11-21 NOTE — TELEPHONE ENCOUNTER
Call to pt he was notified about his ativan has an appointment on Monday Nov 27 Verbalized understanding.

## 2017-11-22 ENCOUNTER — OFFICE VISIT (OUTPATIENT)
Dept: NEUROLOGY | Age: 62
End: 2017-11-22
Payer: COMMERCIAL

## 2017-11-22 VITALS
WEIGHT: 191.4 LBS | BODY MASS INDEX: 26.8 KG/M2 | HEART RATE: 61 BPM | SYSTOLIC BLOOD PRESSURE: 121 MMHG | DIASTOLIC BLOOD PRESSURE: 77 MMHG | HEIGHT: 71 IN

## 2017-11-22 DIAGNOSIS — R53.1 LEFT-SIDED WEAKNESS: Primary | ICD-10-CM

## 2017-11-22 DIAGNOSIS — R25.1 TREMOR: ICD-10-CM

## 2017-11-22 DIAGNOSIS — R42 DIZZINESS: ICD-10-CM

## 2017-11-22 PROCEDURE — G8484 FLU IMMUNIZE NO ADMIN: HCPCS | Performed by: NURSE PRACTITIONER

## 2017-11-22 PROCEDURE — 99214 OFFICE O/P EST MOD 30 MIN: CPT | Performed by: NURSE PRACTITIONER

## 2017-11-22 PROCEDURE — 1036F TOBACCO NON-USER: CPT | Performed by: NURSE PRACTITIONER

## 2017-11-22 PROCEDURE — G8417 CALC BMI ABV UP PARAM F/U: HCPCS | Performed by: NURSE PRACTITIONER

## 2017-11-22 PROCEDURE — G8427 DOCREV CUR MEDS BY ELIG CLIN: HCPCS | Performed by: NURSE PRACTITIONER

## 2017-11-22 PROCEDURE — 3017F COLORECTAL CA SCREEN DOC REV: CPT | Performed by: NURSE PRACTITIONER

## 2017-11-22 RX ORDER — LORAZEPAM 0.5 MG/1
0.5 TABLET ORAL EVERY 8 HOURS PRN
Qty: 30 TABLET | Refills: 0 | Status: SHIPPED | OUTPATIENT
Start: 2017-11-22 | End: 2017-12-07 | Stop reason: ALTCHOICE

## 2017-11-22 RX ORDER — PRIMIDONE 50 MG/1
50 TABLET ORAL 2 TIMES DAILY
Qty: 90 TABLET | Refills: 3 | Status: SHIPPED | OUTPATIENT
Start: 2017-11-22

## 2017-11-22 NOTE — LETTER
November 22, 2017     Salvador Reed   [address] [id number]    Patient: Miguelito Jarvis  MR Number: [id number]  YOB: 1955  Date of Visit: 11/22/2017    Dear Dr. Salvador Reed:      [facility]  [facility], [address], [address] [facility]  Dept: [phone]  Dept Fax: [phone]                              MD Patti Rodriguez MD Ahmed B. Keitha Au, MD Minnie Pride, MD Robet Piper, MD Alo Rodrigues, CNP    New Patient Consultation    11/22/2017    History of Present Illness: Your patient, Miguelito Jarvis  Is here for follow up regarding his with tremor. Patient is a 43-year-old male with prior medical history of prostate cancer, hypertension, and dyslipidemia. Patient continues to have tremor in his bilateral hands and left leg is mainly with posture. The tremor is never at rest.  It is aggravated by anxiety. Patient also complains of dizziness and an occasional cloudy feeling. Patient is going to vestibular rehab which has somewhat improved his symptoms. His therapist has made note that his vertigo may be cervicogenic. Patient does have stiffness in his neck and on his exam displays signs of upper motor neuron disease with an upgoing toe on the left. MRI of the brain with and without contrast was unremarkable.   She did have previous testing with Dr. Laura Lane who did a hearing test, which was normal.  Patient had an electronystagmogram, which, now that we have the report, was normal.  Patient also had an evaluation by cardiology including a Holter monitor and EKG, which were normal.  He is scheduled to have a stress test.        Past Medical History:   Diagnosis Date    Erectile dysfunction     Hypertension     Prostate cancer ([address] GI Constipation: absent, Diarrhea: absent, Swallowing change: absent       Urinary frequency: absent, Urinary urgency: absent, Urinary incontinence: absent   MUSCULOSKELETAL Neck pain: present, Back pain: absent, Stiffness: present, Muscle pain: absent, Joint pain: absent Restless legs: absent   DERMATOLOGIC Hair loss: absent, Skin changes: absent   NEUROLOGIC Memory loss: absent, Confusion: present, Seizures: absent Trouble walking or imbalance: present, Dizziness: present, Weakness: present, Numbness: absent Tremor: present, Spasm: absent, Speech difficulty: absent, Headache: present, Light sensitivity: absent   PSYCHIATRIC Anxiety: absent, Hallucination: absent, Mood disorder: absent   HEMATOLOGIC Abnormal bleeding: absent, Anemia: absent, Clotting disorder: absent, Lymph gland changes: absent       There were no vitals filed for this visit. Admission weight:                                             .                                                                                                    General Appearance:  Alert, cooperative, no signs of distress, appears stated age   Head:  Normocephalic, no signs of trauma   Eyes:  Conjunctiva/corneas clear;  eyelids intact.  No optic disc pallor   Ears:  Normal external ear and canals   Nose: Nares normal, mucosa normal, no drainage    Throat: Lips and tongue normal; teeth normal;  gums normal   Neck: Supple, intact flexion, extension and rotation;   trachea midline;  no adenopathy;   thyroid: not enlarged;   no carotid pulse abnormality   Back:   Symmetric, no curvature, ROM adequate   Lungs:   Respirations unlabored   Chest Wall:  No deformity   Heart:  Regular rate and rhythm                 Extremities: Extremities normal, no cyanosis, no edema   Pulses: Symmetric over head and neck   Skin: Skin color, texture normal, no rashes, no lesions               Neurological Examination    Neurologic Exam     Mental Status Oriented to person, place, and time. Follows 3 step commands. Attention: normal.   Speech: speech is normal   Level of consciousness: alert  Knowledge: good. Normal comprehension. Cranial Nerves     CN II   Visual fields full to confrontation. CN III, IV, VI   Pupils are equal, round, and reactive to light. Extraocular motions are normal.   Nystagmus: none   Diplopia: none  Ophthalmoparesis: none  Upgaze: normal  Downgaze: normal  Conjugate gaze: present    CN V   Facial sensation intact. Right facial sensation deficit: cheeks  Left facial sensation deficit: cheeks  Right corneal reflex: normal  Left corneal reflex: normal    CN VII   Facial expression full, symmetric. Right facial weakness: none  Left facial weakness: none    CN VIII   CN VIII normal.   Hearing: intact    CN IX, X   CN IX normal.     CN XI   CN XI normal.     CN XII   CN XII normal.     Motor Exam   Muscle bulk: normal  Overall muscle tone: normal  Right arm tone: normal  Left arm tone: normal  Right arm pronator drift: absent  Left arm pronator drift: absent  Right leg tone: increased  Left leg tone: increased    Strength   Strength 5/5 throughout.      Sensory Exam   Right arm light touch: normal  Left arm light touch: normal  Right leg light touch: normal  Left leg light touch: decreased from knee  Vibration normal.   Right arm vibration: normal  Left arm vibration: normal  Right arm pinprick: normal  Left arm pinprick: normal  Right leg pinprick: normal  Left leg pinprick: decreased from knee    Gait, Coordination, and Reflexes     Gait  Gait: normal    Coordination   Romberg: negative  Finger to nose coordination: normal  Tandem walking coordination: normal    Tremor   Resting tremor: absent  Intention tremor: present    Reflexes   Right brachioradialis: 2+  Left brachioradialis: 2+  Right biceps: 2+  Left biceps: 2+  Right triceps: 2+  Left triceps: 2+  Right patellar: 3+  Left patellar: 4+  Right achilles: 2+

## 2017-11-22 NOTE — PROGRESS NOTES
[facility]  [facility], [address], [address]  Dept: [phone]  Dept Fax: [phone]                              Yeni Friday, MD Morelia King MD Hershall Barbara, MD Pasco Desanctis, MD Christin Legions, CNP        11/22/2017    History of Present Illness: Your patient, Sandip Rueda  Is here for follow up regarding his with tremor. Patient is a 69-year-old male with prior medical history of prostate cancer, hypertension, and dyslipidemia. Patient continues to have tremor in his bilateral hands and left leg is mainly with posture. The tremor is never at rest.  It is aggravated by anxiety. Patient also complains of dizziness and an occasional cloudy feeling. Patient is going to vestibular rehab which has somewhat improved his symptoms. His therapist has made note that his vertigo may be cervicogenic. Patient does have stiffness in his neck and on his exam displays signs of upper motor neuron disease with an upgoing toe on the left. MRI of the brain with and without contrast was unremarkable. She did have previous testing with Dr. Gualberto Browning who did a hearing test, which was normal.  Patient had an electronystagmogram, which, now that we have the report, was normal.  Patient also had an evaluation by cardiology including a Holter monitor and EKG, which were normal.  He is scheduled to have a stress test.        Past Medical History:   Diagnosis Date    Erectile dysfunction     Hypertension     Prostate cancer [facility])        Past Surgical History:   Procedure Laterality Date    BACK SURGERY      PROSTATE BIOPSY      PROSTATE SURGERY      SHOULDER SURGERY Bilateral     TONSILLECTOMY         No family history on file.     Social History     Social History    Marital status: legs: absent   DERMATOLOGIC Hair loss: absent, Skin changes: absent   NEUROLOGIC Memory loss: absent, Confusion: present, Seizures: absent Trouble walking or imbalance: present, Dizziness: present, Weakness: present, Numbness: absent Tremor: present, Spasm: absent, Speech difficulty: absent, Headache: present, Light sensitivity: absent   PSYCHIATRIC Anxiety: absent, Hallucination: absent, Mood disorder: absent   HEMATOLOGIC Abnormal bleeding: absent, Anemia: absent, Clotting disorder: absent, Lymph gland changes: absent       There were no vitals filed for this visit. Admission weight:                                             .                                                                                                    General Appearance:  Alert, cooperative, no signs of distress, appears stated age   Head:  Normocephalic, no signs of trauma   Eyes:  Conjunctiva/corneas clear;  eyelids intact. No optic disc pallor   Ears:  Normal external ear and canals   Nose: Nares normal, mucosa normal, no drainage    Throat: Lips and tongue normal; teeth normal;  gums normal   Neck: Supple, intact flexion, extension and rotation;   trachea midline;  no adenopathy;   thyroid: not enlarged;   no carotid pulse abnormality   Back:   Symmetric, no curvature, ROM adequate   Lungs:   Respirations unlabored   Chest Wall:  No deformity   Heart:  Regular rate and rhythm                 Extremities: Extremities normal, no cyanosis, no edema   Pulses: Symmetric over head and neck   Skin: Skin color, texture normal, no rashes, no lesions               Neurological Examination    Neurologic Exam     Mental Status   Oriented to person, place, and time. Follows 3 step commands. Attention: normal.   Speech: speech is normal   Level of consciousness: alert  Knowledge: good. Normal comprehension. Cranial Nerves     CN II   Visual fields full to confrontation.      CN III, IV, VI   Pupils are equal, round, and reactive to and advance that to 50 mg twice a day if necessary. We'll monitor the response to that medication and have him return for reevaluation in 6 weeks. 2. We discussed the symptoms of anxiety as well as the treatment. Patient will be given a one-month supply of lorazepam 0.5 mg daily until he is able to see Dr. Tegan Nieves from psychiatry. I advised the patient that he should not take lorazepam for anxiety, that he should be on a long-term agent for treatment. An [facility] report was run prior to prescribing him the lorazepam  2. Vertigo, possibly cervicogenic  1. He will continue with vestibular rehab  3. Left-sided weakness, with no evidence of abnormality on MRI of the brain. Patient does have upper motor neuron signs on the left and with the new information that is available, the patient may have cervical spine disease. 1. Patient will try continuing with vestibular rehab and getting a psychiatric evaluation for his anxiety disorder prior to deciding on MRI of the cervical spine. He states that financially he cannot have it at this time but if all of the other treatments are unsuccessful in controlling his symptoms, he will consider it at that time.         Signed: Mary Khan, CNP

## 2017-11-27 ENCOUNTER — TELEPHONE (OUTPATIENT)
Dept: NEUROLOGY | Age: 62
End: 2017-11-27

## 2017-11-27 DIAGNOSIS — R53.1 LEFT-SIDED WEAKNESS: Primary | ICD-10-CM

## 2017-11-27 RX ORDER — PROPRANOLOL HCL 60 MG
60 CAPSULE, EXTENDED RELEASE 24HR ORAL DAILY
Qty: 30 CAPSULE | Refills: 3 | Status: SHIPPED | OUTPATIENT
Start: 2017-11-27 | End: 2017-12-07 | Stop reason: SDUPTHER

## 2017-11-27 NOTE — TELEPHONE ENCOUNTER
Will order MRI. Mysoline and ativan may make him have the brain fog. He can try propranolol 40 mg for the tremor.   Will order both

## 2017-11-27 NOTE — TELEPHONE ENCOUNTER
Kaden called in. He said the Primidone caused him a rash around his neck. He stopped taking it and the rash cleared. Is there an alternative you want to try? Also he said you had talked about possibly getting an MRI if the primidone didn't work. Do you want to order that now? Lastly, he said he has brain fog. I asked him to explain and he said that he has trouble concentrating. For example he can be watching t.v. and he zones in and out on the program. The Ativan you gave him (which he normally takes just 1/2 tablet every 3-4 days) seems to help him. Please advise.

## 2017-11-29 NOTE — TELEPHONE ENCOUNTER
Kaden called back today. He doesn't have the MRI Cervical spine scheduled yet but he is concerned about the brain fog he has and he said the Ativan using just a half tablet helps him. He would like to come in and talk with you about this brain fog issue. He said his chiropractor did a plain xray of his neck and it was okay. Is it okay for him to come in to discuss the brain fog issue with you?

## 2017-11-30 NOTE — TELEPHONE ENCOUNTER
I spoke with Kaden. He isn't scheduled to see psychiatrist until 1/4/17.   I put him in for Dec. 7 with ARTHUR MORE

## 2017-12-07 ENCOUNTER — OFFICE VISIT (OUTPATIENT)
Dept: NEUROLOGY | Age: 62
End: 2017-12-07
Payer: COMMERCIAL

## 2017-12-07 VITALS
BODY MASS INDEX: 26.88 KG/M2 | HEART RATE: 62 BPM | HEIGHT: 71 IN | DIASTOLIC BLOOD PRESSURE: 83 MMHG | WEIGHT: 192 LBS | SYSTOLIC BLOOD PRESSURE: 128 MMHG

## 2017-12-07 DIAGNOSIS — R53.1 LEFT-SIDED WEAKNESS: ICD-10-CM

## 2017-12-07 DIAGNOSIS — R25.1 TREMOR: ICD-10-CM

## 2017-12-07 DIAGNOSIS — R42 VERTIGO: Primary | ICD-10-CM

## 2017-12-07 PROCEDURE — G8427 DOCREV CUR MEDS BY ELIG CLIN: HCPCS | Performed by: NURSE PRACTITIONER

## 2017-12-07 PROCEDURE — 3017F COLORECTAL CA SCREEN DOC REV: CPT | Performed by: NURSE PRACTITIONER

## 2017-12-07 PROCEDURE — G8417 CALC BMI ABV UP PARAM F/U: HCPCS | Performed by: NURSE PRACTITIONER

## 2017-12-07 PROCEDURE — 1036F TOBACCO NON-USER: CPT | Performed by: NURSE PRACTITIONER

## 2017-12-07 PROCEDURE — 99214 OFFICE O/P EST MOD 30 MIN: CPT | Performed by: NURSE PRACTITIONER

## 2017-12-07 PROCEDURE — G8484 FLU IMMUNIZE NO ADMIN: HCPCS | Performed by: NURSE PRACTITIONER

## 2017-12-07 RX ORDER — PROPRANOLOL HCL 60 MG
60 CAPSULE, EXTENDED RELEASE 24HR ORAL 2 TIMES DAILY
Qty: 60 CAPSULE | Refills: 3 | Status: SHIPPED | OUTPATIENT
Start: 2017-12-07

## 2017-12-07 RX ORDER — DIAZEPAM 5 MG/1
TABLET ORAL
Qty: 2 TABLET | Refills: 0 | Status: SHIPPED | OUTPATIENT
Start: 2017-12-07

## 2017-12-07 NOTE — PROGRESS NOTES
[facility]  [facility], [address], [address] [place] [address]  Dept: [phone]  Dept Fax: [phone]                            MD David Paz MD Warren Blamer, MD Ahmed B. Darilyn Rubin, MD Tomasita Gibbs, MD Roxann Sabins, CNP      12/7/2017    HPI:      Your patient, Kalpesh Echeverria returns for continuing neurologic care. Patient is a 28-year-old man who was initially seen in consultation on November 2, 2017. He has a history of prostate cancer, hypertension and dyslipidemia. He had multiple complaints including tremor mainly on his left side weakness of his left arm and leg, trouble focusing on things, feeling like he is in a fog, and vertigo. He saw an ear nose and throat doctor, Dr. Abelardo Parker, who did a hearing test, electrode nystagmograph which were relatively normal other than a mild hearing loss. Patient was seen again on November 22 after having an MRI of the brain with and without contrast, which was negative. He had also been going to vestibular rehabilitation. Patient was continuing to have bilateral tremor but more so with the left leg. He was also complaining of dizziness and a cloudy feeling at that time as well. His physical therapist called to note that there was no response to the Epley maneuver or of other vestibular maneuvers and suggested that his vertigo may be cervicogenic. Patient did have symptoms of left arm weakness and a question of upgoing toe on the left. An MRI of the cervical spine was suggested, however he was reluctant because of the cost at that time. At that visit, we prescribe Mysoline 50 mg at bedtime in an attempt to control his tremor. He did however call stating that the Mysoline caused a rash.   We then started propanolol 60 mg LA at bedtime, for which he does feel it is helping his disorder: absent, Lymph gland changes: absent           Allergies   Allergen Reactions    Darvocet A500 [Propoxyphene N-Acetaminophen]     Motrin [Ibuprofen]     Percocet [Oxycodone-Acetaminophen]     Valtrex [Valacyclovir Hcl]            Current Outpatient Prescriptions   Medication Sig Dispense Refill    propranolol (INDERAL LA) 60 MG extended release capsule Take 1 capsule by mouth daily 30 capsule 3    primidone (MYSOLINE) 50 MG tablet Take 1 tablet by mouth 2 times daily 90 tablet 3    FLUCELVAX QUADRIVALENT 0.5 ML injection ADM 0.5ML IM UTD  0    simvastatin (ZOCOR) 20 MG tablet TAKE 1 TABLET BY MOUTH EVERY DAY IN THE EVENING 90 tablet 3    metoprolol succinate (TOPROL XL) 50 MG extended release tablet Take 1 tablet by mouth daily 30 tablet 11    fluticasone (FLONASE) 50 MCG/ACT nasal spray 1 spray by Nasal route daily 1 Bottle 3    CIALIS 5 MG tablet   6    tamsulosin (FLOMAX) 0.4 MG capsule   6     No current facility-administered medications for this visit. PHYSICAL EXAMINATION       There were no vitals filed for this visit.                                            .                                                                                                    General Appearance:  Alert, cooperative, no signs of distress, appears stated age   Head:  Normocephalic, no signs of trauma   Eyes:  Conjunctiva/corneas clear;  eyelids intact   Ears:  Normal external ear and canals   Nose: Nares normal, mucosa normal, no drainage    Throat: Lips and tongue normal; teeth normal;  gums normal   Neck: Supple, intact flexion, extension and rotation;   trachea midline;  no adenopathy;   thyroid: not enlarged;   no carotid pulse abnormality   Back:   Symmetric, no curvature, ROM adequate   Lungs:   Respirations unlabored   Heart:  Regular rate and rhythm           Extremities: Extremities normal, no cyanosis, no edema   Pulses: Symmetric over head and neck   Skin: Skin color, texture normal, no rashes, no lesions                                             NEUROLOGIC EXAMINATION    Neurologic Exam  Patient is alert, he is oriented ×3. His speech is fluent, he follows all commands consistently. His visual fields are intact to confrontation bilaterally his extraocular eye movements are intact without nystagmus or extraocular muscle weakness. His corneal reflexes present bilaterally. His face is symmetrical, his tongue protrudes midline, sternocleidomastoid and trapezius muscles are 5/5. Finger rub was heard bilaterally, gag reflex was normal.  There were no tongue fasciculations. Strength is 5/5, except 4/5 in the bicep and tricep of left arm  Muscle tone is normal without spasticity or cogwheeling. There were no muscle fasciculations. Sensation of light touch pinprick and vibration were intact and symmetrical.  Finger to nose and heel-to-shin testing are normal.  His gait is steady, without ataxia, shuffling, or wide-based. His reflexes were 2+ and symmetrical.  During this exam his plantar responses were downgoing bilaterally. He had no tremor of his outstretched hands and no tremor at rest.  He did have tremor, which was distractible, of his left leg. Romberg exam was unremarkable. Hyperventilation for 1 minute did not reproduce his symptoms        ASSESSMENT/PLAN:       In summary, your patient, Josh Arevalo exhibits the following, with associated plan:    1. Tremor, probable benign essential tremor, which is aggravated by anxiety  1. We will increase the propanolol to 60 mg twice daily  2. Persistant vertigo, possible cervicogenic, but we'll rule out possible basilar artery disease  1. We will obtain an MR angiography of the head and neck  3. Left-sided weakness, with no evidence of abnormality on MRI of the brain. Patient does have weakness of his left arm associated with neck pain. We will rule out cervical spine disease  1.  MRI of the cervical spine

## 2017-12-07 NOTE — LETTER
December 7, 2017     Debbi Good DO  [address] [id number]    Patient: Brittni Lazaro  MR Number: [id number]  YOB: 1955  Date of Visit: 12/7/2017    Dear Dr. Debbi Good:        [facility]  [address], [address] [place] [address]  Dept: [phone]  Dept Fax: [phone]                            MD Adina Pleitez MD Cecillia Simmering, MD Ahmed B. Onesimo Diego, MD Erlinda Ching, MD Raf Dodge, ARTHUR      12/7/2017    HPI:      Your patient, Brittni Lazaro returns for continuing neurologic care. Patient is a 69-year-old man who was initially seen in consultation on November 2, 2017. He has a history of prostate cancer, hypertension and dyslipidemia. He had multiple complaints including tremor mainly on his left side weakness of his left arm and leg, trouble focusing on things, feeling like he is in a fog, and vertigo. He saw an ear nose and throat doctor, Dr. Lidia Tavares, who did a hearing test, electrode nystagmograph which were relatively normal other than a mild hearing loss. Patient was seen again on November 22 after having an MRI of the brain with and without contrast, which was negative. He had also been going to vestibular rehabilitation. Patient was continuing to have bilateral tremor but more so with the left leg. He was also complaining of dizziness and a cloudy feeling at that time as well. His physical therapist called to note that there was no response to the Epley maneuver or of other vestibular maneuvers and suggested that his vertigo may be cervicogenic. Patient did have symptoms of left arm weakness and a question of upgoing toe on the left.   An MRI of the cervical spine was suggested, however he was 2. Persistant vertigo, possible cervicogenic, but we'll rule out possible basilar artery disease  1. We will obtain an MR angiography of the head and neck  3. Left-sided weakness, with no evidence of abnormality on MRI of the brain. Patient does have weakness of his left arm associated with neck pain. We will rule out cervical spine disease  1. MRI of the cervical spine as scheduled on December 15    Patient's symptoms, diagnostic testing results and assessment findings were discussed with Dr. Renetta Conti, who agrees with the current plan. If the above evaluation and treatment is ineffective in relieving patient's symptoms, I will have the patient schedule an appointment with either Dr. Patsy Ellington or  at his next visit. I discussed this with the patient who agrees. Signed: Nikolas Berrios CNP        If you have questions, please do not hesitate to call me. I look forward to following Kaden along with you.     Sincerely,        Cassy Kaur CNP

## 2017-12-15 ENCOUNTER — HOSPITAL ENCOUNTER (OUTPATIENT)
Dept: MRI IMAGING | Facility: CLINIC | Age: 62
Discharge: HOME | End: 2017-12-15
Payer: COMMERCIAL

## 2017-12-15 DIAGNOSIS — R53.1 LEFT-SIDED WEAKNESS: ICD-10-CM

## 2017-12-15 DIAGNOSIS — R42 VERTIGO: ICD-10-CM

## 2017-12-15 LAB — POC CREATININE: 1 MG/DL (ref 0.6–1.4)

## 2017-12-15 PROCEDURE — A9579 GAD-BASE MR CONTRAST NOS,1ML: HCPCS | Performed by: NURSE PRACTITIONER

## 2017-12-15 PROCEDURE — 2580000003 HC RX 258: Performed by: NURSE PRACTITIONER

## 2017-12-15 PROCEDURE — 82565 ASSAY OF CREATININE: CPT

## 2017-12-15 PROCEDURE — 6360000004 HC RX CONTRAST MEDICATION: Performed by: NURSE PRACTITIONER

## 2017-12-15 PROCEDURE — 72156 MRI NECK SPINE W/O & W/DYE: CPT

## 2017-12-15 PROCEDURE — 70549 MR ANGIOGRAPH NECK W/O&W/DYE: CPT

## 2017-12-15 PROCEDURE — 70544 MR ANGIOGRAPHY HEAD W/O DYE: CPT

## 2017-12-15 RX ORDER — 0.9 % SODIUM CHLORIDE 0.9 %
30 INTRAVENOUS SOLUTION INTRAVENOUS ONCE
Status: COMPLETED | OUTPATIENT
Start: 2017-12-15 | End: 2017-12-15

## 2017-12-15 RX ORDER — SODIUM CHLORIDE 0.9 % (FLUSH) 0.9 %
10 SYRINGE (ML) INJECTION PRN
Status: DISCONTINUED | OUTPATIENT
Start: 2017-12-15 | End: 2017-12-18 | Stop reason: HOSPADM

## 2017-12-15 RX ADMIN — SODIUM CHLORIDE 40 ML: 9 INJECTION, SOLUTION INTRAVENOUS at 10:32

## 2017-12-15 RX ADMIN — GADOTERIDOL 18 ML: 279.3 INJECTION, SOLUTION INTRAVENOUS at 10:32

## 2017-12-16 ENCOUNTER — HOSPITAL ENCOUNTER (EMERGENCY)
Age: 62
Discharge: HOME | End: 2017-12-16
Attending: EMERGENCY MEDICINE
Payer: COMMERCIAL

## 2017-12-16 ENCOUNTER — TELEPHONE (OUTPATIENT)
Dept: NEUROLOGY | Age: 62
End: 2017-12-16

## 2017-12-16 VITALS
DIASTOLIC BLOOD PRESSURE: 96 MMHG | WEIGHT: 186 LBS | TEMPERATURE: 97.8 F | BODY MASS INDEX: 26.04 KG/M2 | HEIGHT: 71 IN | HEART RATE: 60 BPM | RESPIRATION RATE: 15 BRPM | SYSTOLIC BLOOD PRESSURE: 151 MMHG | OXYGEN SATURATION: 98 %

## 2017-12-16 DIAGNOSIS — R93.89 ABNORMAL MRI: Primary | ICD-10-CM

## 2017-12-16 DIAGNOSIS — R42 DIZZINESS: ICD-10-CM

## 2017-12-16 DIAGNOSIS — R25.1 TREMOR: ICD-10-CM

## 2017-12-16 LAB
ABSOLUTE EOS #: 0.11 K/UL (ref 0–0.44)
ABSOLUTE IMMATURE GRANULOCYTE: 0.04 K/UL (ref 0–0.3)
ABSOLUTE LYMPH #: 2.4 K/UL (ref 1.1–3.7)
ABSOLUTE MONO #: 0.76 K/UL (ref 0.1–1.2)
ANION GAP SERPL CALCULATED.3IONS-SCNC: 12 MMOL/L (ref 9–17)
BASOPHILS # BLD: 0 % (ref 0–2)
BASOPHILS ABSOLUTE: 0.03 K/UL (ref 0–0.2)
BUN BLDV-MCNC: 19 MG/DL (ref 8–23)
BUN/CREAT BLD: NORMAL (ref 9–20)
C-REACTIVE PROTEIN: 1.6 MG/L (ref 0–5)
CALCIUM SERPL-MCNC: 9 MG/DL (ref 8.6–10.4)
CHLORIDE BLD-SCNC: 101 MMOL/L (ref 98–107)
CO2: 24 MMOL/L (ref 20–31)
CREAT SERPL-MCNC: 0.83 MG/DL (ref 0.7–1.2)
DIFFERENTIAL TYPE: ABNORMAL
EOSINOPHILS RELATIVE PERCENT: 1 % (ref 1–4)
GFR AFRICAN AMERICAN: >60 ML/MIN
GFR NON-AFRICAN AMERICAN: >60 ML/MIN
GFR SERPL CREATININE-BSD FRML MDRD: NORMAL ML/MIN/{1.73_M2}
GFR SERPL CREATININE-BSD FRML MDRD: NORMAL ML/MIN/{1.73_M2}
GLUCOSE BLD-MCNC: 96 MG/DL (ref 70–99)
HCT VFR BLD CALC: 42.6 % (ref 40.7–50.3)
HEMOGLOBIN: 14.5 G/DL (ref 13–17)
IMMATURE GRANULOCYTES: 1 %
LYMPHOCYTES # BLD: 30 % (ref 24–43)
MCH RBC QN AUTO: 29.9 PG (ref 25.2–33.5)
MCHC RBC AUTO-ENTMCNC: 34 G/DL (ref 28.4–34.8)
MCV RBC AUTO: 87.8 FL (ref 82.6–102.9)
MONOCYTES # BLD: 10 % (ref 3–12)
PDW BLD-RTO: 12.2 % (ref 11.8–14.4)
PLATELET # BLD: 233 K/UL (ref 138–453)
PLATELET ESTIMATE: ABNORMAL
PMV BLD AUTO: 10.3 FL (ref 8.1–13.5)
POTASSIUM SERPL-SCNC: 4.1 MMOL/L (ref 3.7–5.3)
PROSTATE SPECIFIC ANTIGEN: 0.22 UG/L
RBC # BLD: 4.85 M/UL (ref 4.21–5.77)
RBC # BLD: ABNORMAL 10*6/UL
SEDIMENTATION RATE, ERYTHROCYTE: 6 MM (ref 0–10)
SEG NEUTROPHILS: 58 % (ref 36–65)
SEGMENTED NEUTROPHILS ABSOLUTE COUNT: 4.58 K/UL (ref 1.5–8.1)
SODIUM BLD-SCNC: 137 MMOL/L (ref 135–144)
WBC # BLD: 7.9 K/UL (ref 3.5–11.3)
WBC # BLD: ABNORMAL 10*3/UL

## 2017-12-16 PROCEDURE — 86140 C-REACTIVE PROTEIN: CPT

## 2017-12-16 PROCEDURE — 85025 COMPLETE CBC W/AUTO DIFF WBC: CPT

## 2017-12-16 PROCEDURE — G0383 LEV 4 HOSP TYPE B ED VISIT: HCPCS

## 2017-12-16 PROCEDURE — 6370000000 HC RX 637 (ALT 250 FOR IP): Performed by: EMERGENCY MEDICINE

## 2017-12-16 PROCEDURE — G0103 PSA SCREENING: HCPCS

## 2017-12-16 PROCEDURE — 85651 RBC SED RATE NONAUTOMATED: CPT

## 2017-12-16 PROCEDURE — 80048 BASIC METABOLIC PNL TOTAL CA: CPT

## 2017-12-16 RX ORDER — DIAZEPAM 5 MG/ML
5 INJECTION, SOLUTION INTRAMUSCULAR; INTRAVENOUS ONCE
Status: DISCONTINUED | OUTPATIENT
Start: 2017-12-16 | End: 2017-12-16

## 2017-12-16 RX ORDER — DIAZEPAM 5 MG/ML
2.5 INJECTION, SOLUTION INTRAMUSCULAR; INTRAVENOUS ONCE
Status: DISCONTINUED | OUTPATIENT
Start: 2017-12-16 | End: 2017-12-16

## 2017-12-16 RX ORDER — DIAZEPAM 5 MG/1
5 TABLET ORAL ONCE
Status: COMPLETED | OUTPATIENT
Start: 2017-12-16 | End: 2017-12-16

## 2017-12-16 RX ADMIN — DIAZEPAM 5 MG: 5 TABLET ORAL at 16:59

## 2017-12-16 ASSESSMENT — ENCOUNTER SYMPTOMS
RHINORRHEA: 0
VOMITING: 0
NAUSEA: 0
EYE PAIN: 0
COUGH: 0
SHORTNESS OF BREATH: 0
ABDOMINAL PAIN: 0
BACK PAIN: 0

## 2017-12-16 ASSESSMENT — PAIN DESCRIPTION - FREQUENCY: FREQUENCY: CONTINUOUS

## 2017-12-16 ASSESSMENT — PAIN DESCRIPTION - DESCRIPTORS: DESCRIPTORS: HEADACHE

## 2017-12-16 ASSESSMENT — PAIN DESCRIPTION - PAIN TYPE: TYPE: ACUTE PAIN

## 2017-12-16 ASSESSMENT — PAIN DESCRIPTION - LOCATION: LOCATION: HEAD

## 2017-12-16 ASSESSMENT — PAIN SCALES - GENERAL: PAINLEVEL_OUTOF10: 7

## 2017-12-16 ASSESSMENT — PAIN DESCRIPTION - ORIENTATION: ORIENTATION: POSTERIOR

## 2017-12-16 NOTE — ED PROVIDER NOTES
[address]  ED  Emergency Department Encounter  Emergency Medicine Resident     Pt Name: Jessica Saldana  MRN: [id number]  [place] 1955  Date of evaluation: 12/16/17  PCP:  Shanita Valle DO    CHIEF COMPLAINT       Chief Complaint   Patient presents with    Headache     posterior    Back Pain    Eye Problem     blurred vision intermittently       HISTORY OF PRESENT ILLNESS  (Location/Symptom, Timing/Onset, Context/Setting, Quality, Duration, Modifying Factors, Severity.)      Jessica Saldana is a 60 y.o. male who presents After being sent from his neurologist for concerns of C6-C7 discitis/osteomyelitis. Patient states that he has been having multiple issues since September. Patient states before symptoms started he had a root canal and was not given any antibiotics. Patient states that he started to develop a tremor to his left lower extremity, weakness to the left arm and left lower extremity, as well as dizziness and mental fogginess. Patient states that he was told he had vertigo. Patient states he has been following up with neurology who just completed MRI of his neck as well as MRA of his head and neck. Patient was called today and told of concern for discitis/osteomyelitis and given option to come to the hospital for neurosurgery evaluation or to come office on Monday for lab work. Patient states that currently he feels foggy but denies any blurry vision. Patient denies any current dizziness. Patient does admit to tremor of his left lower extremity. Patient denies any chest pain, shortness of breath, nausea, or vomiting. She denies any fevers. Patient denies any IV drug use. Patient does have history of prostate cancer he states in 2011 which was treated with radiation seeds and he states his follow-up PSAs have been normal.    PAST MEDICAL / SURGICAL / SOCIAL / [place] [employer]      has a past medical history of Erectile dysfunction;  Hypertension; Prostate cancer ([facility] [address] and Vertigo. has a past surgical history that includes Tonsillectomy; back surgery; shoulder surgery (Bilateral); ostate biopsy; and ostate surgery. Social History     Social History    Marital status: Single     Spouse name: N/A    Number of children: N/A    Years of education: N/A     Occupational History    Not on file. Social History Main Topics    Smoking status: Former Smoker     Quit date: 1/1/1983    Smokeless tobacco: Never Used    Alcohol use No    Drug use: No    Sexual activity: Yes     Partners: Female     Other Topics Concern    Not on file     Social History Narrative    No narrative on file       History reviewed. No pertinent family history. Allergies:  Darvocet a500 [propoxyphene n-acetaminophen]; Motrin [ibuprofen]; Percocet [oxycodone-acetaminophen]; and Valtrex [valacyclovir hcl]    Home Medications:  Prior to Admission medications    Medication Sig Start Date End Date Taking? Authorizing Provider   propranolol (INDERAL LA) 60 MG extended release capsule Take 1 capsule by mouth 2 times daily 12/7/17   Delta Shirts, CNP   diazepam (VALIUM) 5 MG tablet Once for MRI and may repeat once if needed.  12/7/17   Delta Shirts, CNP   primidone (MYSOLINE) 50 MG tablet Take 1 tablet by mouth 2 times daily 11/22/17   Delta Shirts, CNP   FLUCELVAX QUADRIVALENT 0.5 ML injection ADM 0.5ML IM UTD 9/5/17   Historical Provider, MD   simvastatin (ZOCOR) 20 MG tablet TAKE 1 TABLET BY MOUTH EVERY DAY IN THE EVENING 9/20/17   Theora Pel, DO   metoprolol succinate (TOPROL XL) 50 MG extended release tablet Take 1 tablet by mouth daily 9/20/17   Theora Pel, DO   fluticasone [facility]) 50 MCG/ACT nasal spray 1 spray by Nasal route daily 9/20/17   Theora Pel, DO   CIALIS 5 MG tablet  9/8/14   Historical Provider, MD   tamsulosin (FLOMAX) 0.4 MG capsule  9/4/14   Historical Provider, MD       REVIEW OF SYSTEMS    (2-9 systems for level 4, 10 or more for level 5)      Review of Systems   Constitutional: Negative for chills and fever. HENT: Negative for congestion and rhinorrhea. Eyes: Negative for pain and visual disturbance. Respiratory: Negative for cough and shortness of breath. Cardiovascular: Negative for chest pain and palpitations. Gastrointestinal: Negative for abdominal pain, nausea and vomiting. Musculoskeletal: Positive for myalgias, neck pain and neck stiffness. Negative for back pain. Skin: Negative for rash and wound. Neurological: Positive for weakness and headaches. Negative for dizziness and numbness. PHYSICAL EXAM   (up to 7 for level 4, 8 or more for level 5)      INITIAL VITALS:   BP (!) 151/96   Pulse 60   Temp 97.8 °F (36.6 °C) (Oral)   Resp 15   Ht 5' 11\" (1.803 m)   Wt 186 lb (84.4 kg)   SpO2 98%   BMI 25.94 kg/m²     Physical Exam   Constitutional: He is oriented to person, place, and time. He appears well-developed and well-nourished. No distress. HENT:   Head: Normocephalic and atraumatic. Mouth/Throat: Oropharynx is clear and moist.   Eyes: Conjunctivae and EOM are normal. Pupils are equal, round, and reactive to light. Neck: Neck supple. Cardiovascular: Normal rate, regular rhythm, normal heart sounds and intact distal pulses. Exam reveals no gallop and no friction rub. No murmur heard. Pulmonary/Chest: Effort normal and breath sounds normal. No respiratory distress. He has no wheezes. He has no rales. Abdominal: Soft. He exhibits no distension. There is no tenderness. There is no rebound and no guarding. Musculoskeletal: He exhibits no edema, tenderness or deformity. Mother strength 4 out of 5 left hip flexion. Motor strength 5 out of 5 right lower extremity. Motor strength 5 out of 5 in bilateral upper cavities. Dorsiflexion and plantar flexion 5 out of 5 bilaterally   Lymphadenopathy:     He has no cervical adenopathy.    Neurological: He is alert and oriented to person, place, and time. No cranial nerve deficit or sensory deficit. Coordination normal. GCS eye subscore is 4. GCS verbal subscore is 5. GCS motor subscore is 6. Resting tremor to the left leg. Sensation intact throughout. Skin: Skin is warm and dry. No rash noted. He is not diaphoretic.        DIFFERENTIAL  DIAGNOSIS     PLAN (LABS / IMAGING / EKG):  Orders Placed This Encounter   Procedures    CBC WITH AUTO DIFFERENTIAL    BASIC METABOLIC PANEL    Sedimentation Rate    C-Reactive Protein    PSA SCREENING    Inpatient consult to Neurosurgery    Inpatient consult to Neurology    Insert peripheral IV       MEDICATIONS ORDERED:  Orders Placed This Encounter   Medications    DISCONTD: diazepam (VALIUM) injection 2.5 mg    DISCONTD: diazepam (VALIUM) injection 5 mg    DISCONTD: diazepam (VALIUM) injection 2.5 mg    diazepam (VALIUM) tablet 5 mg       DDX: Osteomyelitis, discitis, abnormal MRI    DIAGNOSTIC RESULTS / EMERGENCY DEPARTMENT COURSE / MDM     LABS:  Results for orders placed or performed during the hospital encounter of 12/16/17   CBC WITH AUTO DIFFERENTIAL   Result Value Ref Range    WBC 7.9 3.5 - 11.3 k/uL    RBC 4.85 4.21 - 5.77 m/uL    Hemoglobin 14.5 13.0 - 17.0 g/dL    Hematocrit 42.6 40.7 - 50.3 %    MCV 87.8 82.6 - 102.9 fL    MCH 29.9 25.2 - 33.5 pg    MCHC 34.0 28.4 - 34.8 g/dL    RDW 12.2 11.8 - 14.4 %    Platelets 377 166 - 155 k/uL    MPV 10.3 8.1 - 13.5 fL    Differential Type NOT REPORTED     Seg Neutrophils 58 36 - 65 %    Lymphocytes 30 24 - 43 %    Monocytes 10 3 - 12 %    Eosinophils % 1 1 - 4 %    Basophils 0 0 - 2 %    Immature Granulocytes 1 (H) 0 %    Segs Absolute 4.58 1.50 - 8.10 k/uL    Absolute Lymph # 2.40 1.10 - 3.70 k/uL    Absolute Mono # 0.76 0.10 - 1.20 k/uL    Absolute Eos # 0.11 0.00 - 0.44 k/uL    Basophils # 0.03 0.00 - 0.20 k/uL    Absolute Immature Granulocyte 0.04 0.00 - 0.30 k/uL    WBC Morphology NOT REPORTED     RBC Morphology NOT REPORTED     Platelet Estimate NOT REPORTED    BASIC METABOLIC PANEL   Result Value Ref Range    Glucose 96 70 - 99 mg/dL    BUN 19 8 - 23 mg/dL    CREATININE 0.83 0.70 - 1.20 mg/dL    Bun/Cre Ratio NOT REPORTED 9 - 20    Calcium 9.0 8.6 - 10.4 mg/dL    Sodium 137 135 - 144 mmol/L    Potassium 4.1 3.7 - 5.3 mmol/L    Chloride 101 98 - 107 mmol/L    CO2 24 20 - 31 mmol/L    Anion Gap 12 9 - 17 mmol/L    GFR Non-African American >60 >60 mL/min    GFR African American >60 >60 mL/min    GFR Comment          GFR Staging NOT REPORTED    Sedimentation Rate   Result Value Ref Range    Sed Rate 6 0 - 10 mm   C-Reactive Protein   Result Value Ref Range    CRP 1.6 0.0 - 5.0 mg/L       IMPRESSION: Patient evaluated by myself and the attending physician. Patient appears in no acute distress. Patient mildly hypertensive at 151/96 on arrival likely due to pain. Patient also does have history of hypertension. Patient is afebrile. Patient complaining of neck pain and has mild tenderness to palpation midline C-spine. No significant erythema but may be mild swelling in his lower C-spine. Patient does have resting tremor of left lower extremity that goes away on movement. Patient with normal heel-to-shin bilaterally. Sensation intact throughout. No pain in patient's back. Heart rate normal with regular rhythm. Lungs clear to auscultation bilaterally. Abdomen soft, nontender, nondistended. We will continue with lab work including CBC, BMP, ESR, CRP. We'll treat patient symptomatically. Patient states he has had Valium in the past and has helped with his neck stiffness. We'll get neurosurgery consult. RADIOLOGY:  Mra Head Wo Contrast    Result Date: 12/15/2017  EXAMINATION: MRA OF THE HEAD WITHOUT CONTRAST,  12/15/2017 9:38 am TECHNIQUE: MRA of the head was performed utilizing time-of-flight imaging with MIP images. No intravenous contrast was administered.  COMPARISON: 11/14/2017 HISTORY: ORDERING SYSTEM PROVIDED HISTORY: Vertigo TECHNOLOGIST for exam:->vertigo refractory Ordering Physician Provided Reason for Exam: LEFT SIDED NUMBNESS,  LEFT LEG TREMOR NEW ONSET Acuity: Acute Type of Exam: Initial FINDINGS: AORTIC ARCH/GREAT VESSELS: There is a normal branch pattern of the aortic arch. The innominate and subclavian arteries are patent bilaterally. CAROTID ARTERIES: The common carotid and internal carotid arteries are normal in appearance. There is no evidence of a flow-limiting stenosis using NASCET criteria. VERTEBRAL ARTERIES: The vertebral arteries are normal in caliber without evidence of a flow-limiting stenosis or dissection. 1. Unremarkable MRA of the neck. EKG  None    All EKG's are interpreted by the Emergency Department Physician who either signs or Co-signs this chart in the absence of a cardiologist.    EMERGENCY DEPARTMENT COURSE:  Spoke to Ned Nielsen of neurosurgery after she assessed patient and spoke to Dr. Jodi Hodge of neurosurgery. He did review imaging and is not convinced patient is suffering from osteomyelitis or discitis. Patient with normal white blood cell count, ESR, CRP, and is afebrile. Recommendations from neurosurgery are for checking PSA and can follow up as outpatient. Spoke to patient and informed him of plan. Patient states he gets PSA checked regularly. Patient appears frustrated as he thought this was the cause of his symptoms and unsure why he was told to come to the ED. I informed him that he was instructed to come to the ED for neurosurgical evaluation. Did speak to patient's neurology NP, Anshul Mora and informed her of patient's labs as well as neurosurgery evaluation. No further recommendations from her at this time and recommend following up as an outpatient. Patient agrees with plan. Patient be discharged. Patient offered opportunity to ask questions and all questions were answered appropriately. Patient given return instructions.     PROCEDURES:  None    CONSULTS:  IP CONSULT TO NEUROSURGERY  IP CONSULT TO NEUROLOGY    CRITICAL CARE:  None    FINAL IMPRESSION      1. Abnormal MRI    2. Tremor    3. Dizziness          DISPOSITION / PLAN     DISPOSITION Decision to Discharge    PATIENT REFERRED TO:  Leatha Moseley DO  [address] [id number]    Call in 2 days  To follow up within one week    Peggy Aly, [address]  [address] [facility]  [phone]    Call in 2 days  To follow up within one week    Corazon Rasheed MD  [address] [facility]  [phone]    Call in 2 days  To follow up within 2 weeks    [facility]  [address] [place] [address]  [phone]  Today  As needed, If symptoms worsen    MD Miya Gutierrez. [address] [facility]  [phone]    Schedule an appointment as soon as possible for a visit  Please call to schedule appointment with Dr Mojgan Mckenzie.        DISCHARGE MEDICATIONS:  Discharge Medication List as of 12/16/2017  5:15 PM          Tresa Wang DO  Emergency Medicine Resident    (Please note that portions of this note were completed with a voice recognition program.  Efforts were made to edit the dictations but occasionally words are mis-transcribed.)        Tresa Wang DO  12/16/17 [id number]

## 2017-12-16 NOTE — ED PROVIDER NOTES
neurology      Pre-hypertension/Hypertension: The patient has been informed that they may have pre-hypertension or Hypertension based on a blood pressure reading in the emergency department. I recommend that the patient call the primary care provider listed on their discharge instructions or a physician of their choice this week to arrange follow up for further evaluation of possible pre-hypertension or Hypertension.                 Harshal [address]  12/16/17 [id number]

## 2017-12-16 NOTE — CONSULTS
Department of Neurosurgery                                            Nurse Practitioner Consult Note      Reason for Consult:  C6-7 osteo-discitis  Requesting Physician:  Dr Radu Rdz:   []Dr. Bebe Galeas     History Obtained From:  patient, electronic medical record    CHIEF COMPLAINT:         Abnormal MRI findings    HISTORY OF PRESENT ILLNESS:       The patient is a 60 y.o. male who presents by recommendation of his neurologist due to MRI cervical spine findings of possible C6-7 osteomyelitis and discitis. Hx lumbar surgery in 1993 and 2001 by Dr Bebe Galeas, LP by pain management in 2006 for back pain, no IV drug use  Sep root canal afterward started having left foot tremor at rest and left arm and leg weakness dizziness mostly while turning head to the right has been getting vestibular therapy,   ENT eval 2-3 weeks after root canal received abx for 2 days for possible jaw infection and hearing test  neurology for left foot tremor and left arm and leg weakness who ordered MRI cervical and MRA neck  Prostate cancer in 2011 with regular follow up, reports no concerns with PSA level  Denies neck pain. Denies pain radiating into arms. Has some numbness to left upper arm but states has been there since shoulder surgery. Notice blurry vision at times since root canal. Denies fever, chills or night sweats. PAST MEDICAL HISTORY :       Past Medical History:        Diagnosis Date    Erectile dysfunction     Hypertension     Prostate cancer ([address]     Vertigo 2017       Past Surgical History:        Procedure Laterality Date    BACK SURGERY      PROSTATE BIOPSY      PROSTATE SURGERY      SHOULDER SURGERY Bilateral     TONSILLECTOMY         Social History:   Social History     Social History    Marital status: Single     Spouse name: N/A    Number of children: N/A    Years of education: N/A     Occupational History    Not on file.      Social History Main Topics    Smoking status: Former and sore throat   RESPIRATORY: negative for cough, shortness of breath   CARDIOVASCULAR: negative for chest pain, palpitations   GASTROINTESTINAL: negative for nausea, vomiting   GENITOURINARY: negative for incontinence   MUSCULOSKELETAL: Positive for neck, negative for back pain   NEUROLOGICAL: negative for seizures, positive for tremor   PSYCHIATRIC: negative for agitated     Review of systems otherwise negative.     PHYSICAL EXAM:       BP (!) 151/96   Pulse 60   Temp 97.8 °F (36.6 °C) (Oral)   Resp 15   Ht 5' 11\" (1.803 m)   Wt 186 lb (84.4 kg)   SpO2 98%   BMI 25.94 kg/m²       CONSTITUTIONAL: no apparent distress, appears stated age   HEAD: normocephalic, atraumatic   ENT: moist mucous membranes, uvula midline   NECK: supple, symmetric, no midline tenderness to palpation, no pain with ROM   BACK: without midline tenderness, step-offs or deformities   LUNGS: clear to auscultation bilaterally   CARDIOVASCULAR: regular rate and rhythm   ABDOMEN: Soft, non-tender, non-distended with normal active bowel sounds   NEUROLOGIC:  EYE OPENING     Spontaneous - 4 [x]       To voice - 3 []       To pain - 2 []       None - 1 []    VERBAL RESPONSE     Appropriate, oriented - 5 [x]       Dazed or confused - 4 []       Syllables, expletives - 3 []       Grunts - 2 []       None - 1 []    MOTOR RESPONSE     Spontaneous, command - 6 [x]       Localizes pain - 5 []       Withdraws pain - 4 []       Abnormal flexion - 3 []       Abnormal extension - 2 []       None - 1 []            Total GCS: 15    Mental Status:  Alert and oriented x3, follows all commands, speech clear               Cranial Nerves:               CN II-XII grossly intact except:  V: Facial sensation:  abnormal right V2 and 3 decrease to light touch since root canal    Motor Exam:    Drift:  absent  Tone:  normal    Motor exam is symmetrical 5 out of 5 all extremities bilaterally              Resting tremor noted to left foot, no tremor with movement    Sensory:    Right Upper Extremity:  normal  Left Upper Extremity:  abnormal - decrease light touch to biceps present since left shoulder surgery, decreased light touch to left hand  Right Lower Extremity:  normal  Left Lower Extremity:  normal    Deep Tendon Reflexes:    Right Bicep:  2+  Left Bicep:  2+  Right Knee:  2+  Left Knee:  2+    Plantar Response:    Right:  downgoing  Left:  downgoing    Clonus:  absent  Carlin's:  absent    Gait:  Not assesed   SKIN: no rash       LABS AND IMAGING:     CBC with Differential:    Lab Results   Component Value Date    WBC 7.9 12/16/2017    RBC 4.85 12/16/2017    RBC 4.70 12/09/2011    HGB 14.5 12/16/2017    HCT 42.6 12/16/2017     12/16/2017     12/09/2011    MCV 87.8 12/16/2017    MCH 29.9 12/16/2017    MCHC 34.0 12/16/2017    RDW 12.2 12/16/2017    LYMPHOPCT 30 12/16/2017    MONOPCT 10 12/16/2017    BASOPCT 0 12/16/2017    MONOSABS 0.76 12/16/2017    LYMPHSABS 2.40 12/16/2017    EOSABS 0.11 12/16/2017    BASOSABS 0.03 12/16/2017    DIFFTYPE NOT REPORTED 12/16/2017     BMP:    Lab Results   Component Value Date     12/16/2017    K 4.1 12/16/2017     12/16/2017    CO2 24 12/16/2017    BUN 19 12/16/2017    CREATININE 0.83 12/16/2017    CALCIUM 9.0 12/16/2017    GFRAA >60 12/16/2017    LABGLOM >60 12/16/2017    GLUCOSE 96 12/16/2017    GLUCOSE 81 12/09/2011       Radiology Review:      Mra Head Wo Contrast  Result Date: 12/15/2017  EXAMINATION: MRA OF THE HEAD WITHOUT CONTRAST,  12/15/2017 9:38 am TECHNIQUE: MRA of the head was performed utilizing time-of-flight imaging with MIP images. No intravenous contrast was administered.  COMPARISON: 11/14/2017 HISTORY: ORDERING SYSTEM PROVIDED HISTORY: Vertigo TECHNOLOGIST PROVIDED HISTORY: Reason for exam:->VERTIGO REFRACTORY Ordering Physician Provided Reason for Exam: VERTIGO REFRACTORY, NEW ONSET OF TREMORS Acuity: Unknown Type of Exam: Initial FINDINGS: ANTERIOR CIRCULATION: The internal carotid arteries are normal in caliber. The anterior cerebral arteries are normal in appearance. The A-comm is patent. The middle cerebral arteries are normal in appearance. POSTERIOR CIRCULATION: The vertebrobasilar system is unremarkable. The posterior cerebral arteries are normal in appearance. ANEURYSM: No intracranial aneurysm is seen. 1. Unremarkable MRA of the brain. Mri Cervical Spine W Wo Contrast  Result Date: 12/15/2017  EXAMINATION: MRI OF THE CERVICAL SPINE WITHOUT AND WITH CONTRAST  12/15/2017 10:37 am: TECHNIQUE: Multiplanar multisequence MRI of the cervical spine was performed without and with the administration of intravenous contrast. COMPARISON: None. HISTORY: ORDERING SYSTEM PROVIDED HISTORY: Left-sided weakness TECHNOLOGIST PROVIDED HISTORY: Reason for exam:->left sided weakness Ordering Physician Provided Reason for Exam: LEFT SIDED NUMBNESS NEW ONSET TREMORS, VERTIGO Acuity: Unknown Type of Exam: Initial FINDINGS: BONES/ALIGNMENT: There is straightening of the cervical spine with loss of the normal lordotic curvature. There is enhancement noted along the left lateral margin of the C6-C7 disc space as well as the C6 inferior endplate and C7 superior endplate. This is concerning for underlying infection given the enhancement of the disc as this is an abnormal finding. There are no other areas of abnormal contrast enhancement identified. There is some failed fat suppression on the postcontrast images along the lateral aspect of the T1-T2 disc space which is symmetric. No prevertebral phlegmon is noted. SPINAL CORD: The visualized posterior fossa structures are unremarkable. The cervical spinal cord demonstrates normal signal intensity without evidence of an expansile mass lesion. No areas of abnormal contrast enhancement are noted in the cervical spinal cord. SOFT TISSUES: No abnormal enhancement of the cervical spine. No paraspinal mass identified.  C2-C3: There is no significant disc protrusion, spinal canal stenosis or neural foraminal narrowing. C3-C4: There is a 2 mm central disc protrusion indenting the anterior thecal sac. There is minimal central spinal canal narrowing. There is bilateral uncovertebral spurring and facet arthropathy. Mild bilateral foraminal narrowing. C4-C5: There is a 1 mm central disc protrusion indenting the anterior thecal sac. There is no central spinal canal stenosis. Bilateral facet arthropathy and uncovertebral spurring with mild bilateral foraminal narrowing. C5-C6: There is a 2 mm central disc protrusion indenting the anterior thecal sac. Mild central spinal canal stenosis. Asymmetric right-sided uncovertebral spurring with moderate right and mild left foraminal narrowing. C6-C7: There is a 2 mm central disc protrusion indenting the anterior thecal sac. Mild central spinal canal narrowing. Asymmetric left-sided uncovertebral spurring with moderate left foraminal narrowing. No right foraminal narrowing. C7-T1: There is no significant disc protrusion, spinal canal stenosis or neural foraminal narrowing. 1. Findings suspicious for osteo-discitis at C6-C7, asymmetric along the left side of the disc. 2. Mild central spinal canal narrowing at C5-C6 and C6-C7. 3. Foraminal narrowing, as above. Mra Neck W Wo Contrast  Result Date: 12/15/2017  EXAMINATION: MRA OF THE NECK WITH AND WITHOUT CONTRAST 12/15/2017 10:35 am TECHNIQUE: Multiplanar multisequence MRA of the neck was performed with and without the administration of intravenous contrast. Stenosis of the internal carotid arteries measured using NASCET criteria. COMPARISON: None.  HISTORY: ORDERING SYSTEM PROVIDED HISTORY: Vertigo TECHNOLOGIST PROVIDED HISTORY: Reason for exam:->vertigo refractory Ordering Physician Provided Reason for Exam: LEFT SIDED NUMBNESS,  LEFT LEG TREMOR NEW ONSET Acuity: Acute Type of Exam: Initial FINDINGS: AORTIC ARCH/GREAT VESSELS: There is a normal branch pattern of

## 2017-12-16 NOTE — ED NOTES
Pt to ED, steady gait to room 42 with c/o possible spinal infection and posterior headache. Pt states he had a tooth that was infected and has spread to his spine, pt got blood work done at doctors office and was sent here. Pt denies sob, chest pain, n/v/d. Pt a/o x 4. NAD, rr even, unlabored.       Natalia Butler, RN  77/36/43 [id number]